# Patient Record
Sex: MALE | Race: WHITE
[De-identification: names, ages, dates, MRNs, and addresses within clinical notes are randomized per-mention and may not be internally consistent; named-entity substitution may affect disease eponyms.]

---

## 2017-05-26 ENCOUNTER — HOSPITAL ENCOUNTER (OUTPATIENT)
Dept: HOSPITAL 75 - CARD | Age: 67
End: 2017-05-26
Attending: NURSE PRACTITIONER
Payer: MEDICARE

## 2017-05-26 DIAGNOSIS — E78.4: ICD-10-CM

## 2017-05-26 DIAGNOSIS — I25.10: ICD-10-CM

## 2017-05-26 DIAGNOSIS — I35.8: Primary | ICD-10-CM

## 2017-05-26 PROCEDURE — 93306 TTE W/DOPPLER COMPLETE: CPT

## 2017-05-30 NOTE — ECHOCARDIOGRAPHY REPORT
DATE OF SERVICE:  05/26/2017



ECHOCARDIOGRAM



ORDERING PHYSICIAN:

JOHN Molina



CLINICAL DIAGNOSES:

Cardiac systolic murmur, coronary artery disease, hyperlipidemia.



MEASUREMENTS:

Aortic root, 3.9.  

Left atrium, 4.  

LV diameter diastolic, 4.2. 

aVF thickness diastolic, 1.1.  

LVPW thickness diastolic, 1.1.



DESCRIPTION:

Two dimensional echocardiography shows normal global left ventricular systolic

function without any distinct regional wall motion abnormality.  Aortic, mitral

and tricuspid valve leaflets show good leaflet excursion.  There is moderate

aortic valve sclerosis and calcification.  Aortic valve leaflet structure is not

very well visualized.  Doppler imaging shows trivial mitral and tricuspid

regurgitation.  Pulmonary artery systolic pressure is estimated to be within

normal limits.  There is no Doppler evidence of any significant valvular

stenosis.  Subcostal views are limited and difficult.  On the views available,

there did not appear to be any distinct evidence of significant intracardiac

shunt.  However, the subcostal views are limited and intracardiac shunt cannot

be excluded on this study.



CONCLUSIONS:

1.  Normal global left ventricular systolic function with ejection fraction 55%

of 60%.

2.  Trivial mitral and tricuspid regurgitation.

3.  Aortic valve sclerosis, moderate, without valvular stenosis.

4.  Pulmonary artery systolic pressure is estimated to be within normal limits.





Job ID: [id number]

DocumentID: [id number]

Dictated Date:  05/30/2017 15:37:15

Transcription Date: 05/30/2017 17:14:44

Dictated By: SKYLER CLOUD MD, MA, FACP, FACC,

## 2017-10-03 NOTE — HISTORY AND PHYSICAL
DATE OF SERVICE:  



This is for outpatient surgery on 10/11/2017 for left knee arthroscopy.



HISTORY:

The patient is a 67-year-old gentleman with complaints of left knee pain.  He

has undergone treatment with injections which provided temporary relief of his

symptoms.  He reports pain on the lateral aspect of his knee.  He reports

popping, catching and swelling.  He also reports pain medially.  He ambulates

with a cane occasionally due to the pain, due to functional impairment and

failure to improve with conservative measures.  The patient has elected to

proceed with surgical intervention.



REVIEW OF SYSTEMS:

No chest pain.  No shortness of breath, no dysuria.



PAST MEDICAL HISTORY:

Coronary artery disease, chronic obstructive pulmonary disease, lumbago,

hyperlipidemia, hypothyroidism.



PAST SURGICAL HISTORY:

Rhinoplasty, coronary stent, CABG.



FAMILY HISTORY:

Cancer, lung disease, ischemic heart disease.



PRIMARY CARE PHYSICIAN:

Dr. Alicia.



MEDICATIONS:

1.  Furosemide.

2.  Metoprolol.

3.  Lipitor.

4.  Nitrostat.

5.  ProAir.

6.  Pravastatin.

7.  Prednisone.

8.  Advair.

9.  Levothyroxine.

10.  Aspirin.

11.  Potassium.



ALLERGIES:

No known drug allergies.



SOCIAL HISTORY:

The patient smokes one pack of cigarettes per day.  Drinks alcohol occasionally.

 



PHYSICAL EXAMINATION:  

GENERAL:  The patient is well-developed, well-nourished in no acute distress.

HEENT:  Normocephalic and atraumatic.  Pupils are equal, round and reactive to

light.  Oropharynx is clear.

NECK:  Supple.  No lymphadenopathy. 

LUNGS:  Clear to auscultation bilaterally.

HEART:  Regular rate and rhythm.  

ABDOMEN:  Soft, nontender and nondistended.

EXTREMITIES:  The left knee demonstrates no erythema or warmth.  No skin

lesions.  Range of motion is 0/0/130.  He has tenderness over the medial and

lateral joint lines and has pain medially with Mikki's.  He has pain with

_____ with crepitus noted.  He is _____ stable in all planes.



IMPRESSION:

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



PLAN:

Left knee arthroscopy with partial medial meniscectomy and chondroplasty.  The

risks, benefits, options, ramifications and recovery have been discussed with

the patient.  He understands and wishes to proceed.





Job ID: [id number]

DocumentID: [id number]

Dictated Date:  10/03/2017 15:12:34

Transcription Date: 10/03/2017 16:21:56

Dictated By: СЕРГЕЙ CERVANTES MD

## 2017-10-05 ENCOUNTER — HOSPITAL ENCOUNTER (OUTPATIENT)
Dept: HOSPITAL 75 - PREOP | Age: 67
Discharge: HOME | End: 2017-10-05
Attending: ORTHOPAEDIC SURGERY
Payer: MEDICARE

## 2017-10-05 VITALS — WEIGHT: 222.19 LBS | HEIGHT: 71 IN | BODY MASS INDEX: 31.1 KG/M2

## 2017-10-05 VITALS — SYSTOLIC BLOOD PRESSURE: 132 MMHG | DIASTOLIC BLOOD PRESSURE: 73 MMHG

## 2017-10-05 DIAGNOSIS — S83.242A: ICD-10-CM
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
## 2017-10-05 DIAGNOSIS — Z01.812: Primary | ICD-10-CM
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
## 2017-10-05 LAB
ANION GAP SERPL CALC-SCNC: 8 MMOL/L (ref 5–14)
BUN SERPL-MCNC: 11 MG/DL (ref 7–18)
BUN/CREAT SERPL: 11
CALCIUM SERPL-MCNC: 8.9 MG/DL (ref 8.5–10.1)
CHLORIDE SERPL-SCNC: 106 MMOL/L (ref 98–107)
CO2 SERPL-SCNC: 24 MMOL/L (ref 21–32)
CREAT SERPL-MCNC: 1.04 MG/DL (ref 0.6–1.3)
GFR SERPLBLD BASED ON 1.73 SQ M-ARVRAT: > 60 ML/MIN
GLUCOSE SERPL-MCNC: 109 MG/DL (ref 70–105)
POTASSIUM SERPL-SCNC: 4.2 MMOL/L (ref 3.6–5)
SODIUM SERPL-SCNC: 138 MMOL/L (ref 135–145)

## 2017-10-05 PROCEDURE — 36415 COLL VENOUS BLD VENIPUNCTURE: CPT

## 2017-10-05 PROCEDURE — 80048 BASIC METABOLIC PNL TOTAL CA: CPT

## 2017-10-05 PROCEDURE — 87081 CULTURE SCREEN ONLY: CPT

## 2017-10-11 ENCOUNTER — HOSPITAL ENCOUNTER (OUTPATIENT)
Dept: HOSPITAL 75 - SDC | Age: 67
Discharge: HOME | End: 2017-10-11
Attending: ORTHOPAEDIC SURGERY
Payer: MEDICARE

## 2017-10-11 VITALS — SYSTOLIC BLOOD PRESSURE: 126 MMHG | DIASTOLIC BLOOD PRESSURE: 98 MMHG

## 2017-10-11 VITALS — DIASTOLIC BLOOD PRESSURE: 77 MMHG | SYSTOLIC BLOOD PRESSURE: 132 MMHG

## 2017-10-11 VITALS — SYSTOLIC BLOOD PRESSURE: 116 MMHG | DIASTOLIC BLOOD PRESSURE: 92 MMHG

## 2017-10-11 VITALS — BODY MASS INDEX: 31.1 KG/M2 | WEIGHT: 222.19 LBS | HEIGHT: 71 IN

## 2017-10-11 VITALS — SYSTOLIC BLOOD PRESSURE: 140 MMHG | DIASTOLIC BLOOD PRESSURE: 89 MMHG

## 2017-10-11 DIAGNOSIS — E78.5: ICD-10-CM

## 2017-10-11 DIAGNOSIS — M23.222: ICD-10-CM

## 2017-10-11 DIAGNOSIS — J44.9: ICD-10-CM

## 2017-10-11 DIAGNOSIS — Z79.82: ICD-10-CM

## 2017-10-11 DIAGNOSIS — I25.10: ICD-10-CM

## 2017-10-11 DIAGNOSIS — M54.5: ICD-10-CM

## 2017-10-11 DIAGNOSIS — Z79.899: ICD-10-CM

## 2017-10-11 DIAGNOSIS — M23.252: Primary | ICD-10-CM

## 2017-10-11 DIAGNOSIS — M94.262: ICD-10-CM

## 2017-10-11 DIAGNOSIS — E03.9: ICD-10-CM

## 2017-10-11 NOTE — PROGRESS NOTE-PRE OPERATIVE
Pre-Operative Progress Note


H&P Reviewed


The H&P was reviewed, patient examined and no changes noted.


Date Seen by Provider:  Oct 11, 2017


Time Seen by Provider:  08:47


Date H&P Reviewed:  Oct 11, 2017


Time H&P Reviewed:  08:47


Pre-Operative Diagnosis:  left knee medial meniscus tear and chondromalacia











СЕРГЕЙ CERVANTES MD Oct 11, 2017 08:47

## 2017-10-11 NOTE — PHYSICAL THERAPY ORTHO EVAL
PT Orthopedic Evaluation


Type of Surgery


Knee Scope


Elective Left knee scope due to progressive pain and degeneration.  Reports his 

knee feels better than it did before surgery today.





Prior Level of Function


Current Living Status:  Spouse


Locomotion     (Upon Admit):  Independent


Established Durable Medical Eq:  Straight Cane





Subjective


Subjective


Pt reports he is feeling good and ready to get home.  He is hoping to golf 

later this week.


Entry Into Home:  Stairs With Railing


Steps Into Home:  2


Steps Accessories:  Railing Present





Motor Control


Motor Control:  Motor Control WNL





ROM


ROM:  WFL, except focal deficit


left knee active 8-110 degrees





Strength


Strength:  WFL





Transfer


Transfers (B, C, W/C) (FIM):  6





Gait


Gait Assistive Device:  Cane Single Point


Left Lower Extremity:  Left


Weight Bearing Status LLE:  Weight Bearing/Tolerated





Instructed patient on sequence for stair ambulation.  Advised to limit


time on his leg today with instruction to ice and elevate.


Distance (FIM):  3=150 ft


Distance:  150


Gait Level of Assist:  6


Summary/Comments


demonstrates safe use of single pain cane





Treatment Rendered


Treatment:  Therapeutic Exercises, Issued Written HEP


Exercise Instruction:  Quad Sets, Straight Leg Raise, Heel Slides





Assessment/Goals


Goal Time Frame:  1 Visit


Understands HEP:  Yes


Safe Ambulation:  Yes





Plan


Treatment Plan:  Discharge


Treatment Duration:  1 visit


Visits Per Week:  1


PT/Family Agrees to Plan:  Yes





Time


Time In:  1050


Time Out:  1110


Total Billed Treatment Time:  15


Billed Treatment Time


visit, evaluation moderate complexity 20 minutes


Yes





PT/OT Therapy GCodes


Therapy


Functional Limitation:  Physical Therapy


Test(s)/Tool used to determine:  FIM





Functional Limitation-Current


Charge Code:  MOBCUR


Modifier:  CH





Functional Limitation-Goal


Charge Code:  MOBGOAL


Modifier:  CH





Functional Limitation-D/C


Charge Codes:  MOBDC


Modifier:  CH











PAT CHU PT Oct 11, 2017 11:18

## 2017-10-11 NOTE — PROGRESS NOTE-POST OPERATIVE
Post-Operative Progess Note


Surgeon (s)/Assistant (s)


Surgeon


СЕРГЕЙ CERVANTES MD


Assistant:  Da Martínez





Pre-Operative Diagnosis


left knee medial meniscus tear and chondromalacia





Post-Operative Diagnosis





left knee medial and lateral  meniscus tears and chondromalacia of the


medial femoral condyle and medial tibial plateau





Procedure & Operative Findings


Date of Procedure


10/11/17


Procedure Performed/Findings


left knee arthroscopic partial medial and lateral meniscectomies and 

chondroplasty of the medial femoral condyle and medial tibial plateau


Anesthesia Type


GETA





Estimated Blood Loss


Estimated blood loss (mL):  minimal





Specimens/Packing


Specimens Removed


none


Packing:  


none











СЕРГЕЙ CERVANTES MD Oct 11, 2017 08:49

## 2017-10-12 NOTE — OPERATIVE REPORT
DATE OF SERVICE:  10/11/2017



PREOPERATIVE DIAGNOSES:

1.  Left knee medial meniscal tear.

2.  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 



POSTOPERATIVE DIAGNOSES:

1.  Left knee medial meniscal tear.

2.  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  

3.  Left knee chondromalacia of the medial tibial plateau. 

4.  Left knee lateral meniscus tear.  



PROCEDURES:  

1.  Left knee arthroscopic partial medial meniscectomy.

2.  Left knee arthroscopic partial lateral meniscectomy.  

3.  Left knee arthroscopic chondroplasty of the medial femoral condyle.  

4.  Left knee arthroscopic chondroplasty of the medial tibial plateau.  



SURGEON:  

Amilcar Cervantes M.D.



ASSISTANT:

TAWNY Lezama, who assisted throughout the procedure and closed the

incisions.



ANESTHESIA:  

General endotracheal by Cindy Bernal CRNA.



TOURNIQUET TIME:  

Not applicable.  



ESTIMATED BLOOD LOSS:  

Minimal.



DRAINS:  

None.  



COMPLICATIONS:  

None.  



POSTOPERATIVE PLANS:  

Routine arthroscopy protocol.  The patient was transferred to the recovery room

awake and in stable condition.  



STATEMENT OF MEDICAL NECESSITY:

The patient is a 67-year-old gentleman with complaints of left medial knee pain,

catching, locking and swelling.  He was tender along his medial joint line and

had pain medially with Mikki's.  He had undergone treatment with injections,

anti-inflammatories and rest without relief.  Due to functional impairment and

failure to improve with conservative measures, the patient elected to proceed

with surgical intervention.  Examination under anesthesia revealed range of

motion 0/3/130 with the negative Lachman, negative anterior and posterior

drawer.  No varus or valgus laxity and negative pivot shift.  



ARTHROSCOPIC FINDINGS:  

The patella _____ demonstrated no gross condylar abnormalities.  The medial and

lateral gutters were clear.  There lateral compartment demonstrated a horizontal

cleavage tear of the posterior horn and body of the meniscus involving

approximately one third of the meniscus.   No significant chondral pathology was

noted.  The ACL and PCL were intact.  The medial compartment demonstrated

diffuse grade 3 chondral flaps with the central portion of the femoral condyle

in a 15 x 20 area and over the central portion of the tibial plateau in a 15 x

15 area.  In addition there was a horizontal cleavage tear of the posterior horn

of the medial meniscus involving approximately one half of the posterior horn.



PROCEDURE:  

After risks and benefits of the procedure were discussed and questions were

answered, an informed consent was signed and placed on the chart.  The operative

site was confirmed in the preoperative holding area and initialed by the

surgeons.  The patient then transported to the operating room and after adequate

levels of general endotracheal anesthetic were obtained and time out was called

and confirmed the operative site.  Exam under anesthesia was performed with the

above findings noted.  The left lower extremity was prepped and draped in the

usual sterile fashion.  The knee joint was injected with 60 mL of fluid and a

standard inferior lateral portal was placed through the arthroscope.  Under

direct visualization inferior medial port was created, the menisci cruciates

carefully probed with no findings noted.  The unstable chondral flaps on the

medial femoral condyle and medial tibial plateau were debrided and shaved back

to a staple edge.  Posterior horn of the medial meniscus was debrided with a

biter and shaver removing approximately 1/2 of the posterior horn.  This was

carefully probed with no further tearing or instability noted.  The scope was

then redirected into the lateral compartment where the unstable lateral meniscus

tear was debrided with a biter and shaver, removing approximately 1/3 of the

posterior horn and body.  This was carefully probed with no further tearing or

instability noted.  The knee was copiously irrigated and port site was closed

with 3-0 nylon in simple interrupted fashion.  The knee was injected with

Duramorph and the port sites were infiltrated plain Marcaine and soft dressing

was applied and the patient transferred to the recovery room awake in stable

condition.





Job ID: [id number]

DocumentID: [id number]

Dictated Date:  10/11/2017 09:30:25

Transcription Date: 10/11/2017 17:22:56

Dictated By: AMILCAR CERVANTES MD

## 2018-11-13 ENCOUNTER — HOSPITAL ENCOUNTER (OUTPATIENT)
Dept: HOSPITAL 75 - CARD | Age: 68
End: 2018-11-13
Attending: NURSE PRACTITIONER
Payer: MEDICARE

## 2018-11-13 VITALS — HEIGHT: 71 IN | WEIGHT: 222 LBS | BODY MASS INDEX: 31.08 KG/M2

## 2018-11-13 DIAGNOSIS — I25.10: Primary | ICD-10-CM

## 2018-11-13 DIAGNOSIS — E78.5: ICD-10-CM

## 2018-11-13 DIAGNOSIS — R06.02: ICD-10-CM

## 2018-11-13 PROCEDURE — 93017 CV STRESS TEST TRACING ONLY: CPT

## 2018-11-13 PROCEDURE — 78452 HT MUSCLE IMAGE SPECT MULT: CPT

## 2018-11-14 NOTE — STRESS TEST
DATE OF SERVICE:  11/13/2018



RESTING AND POST REGADENOSON TECHNETIUM-99M TETROFOSMIN SPECT CT IMAGING



ORDERING PHYSICIAN:

JOHN Molina.





CLINICAL DIAGNOSES:

Shortness of breath, coronary artery disease.



Baseline images were carried out after injection of 11 mCi technetium-99m

Tetrofosmin.  This was followed by 0.4 mg regadenoson and 29.8 mCi

technetium-99m Tetrofosmin for stress imaging.  The electrocardiogram showed

sinus rhythm at baseline.  The electrocardiogram did not change significantly

with the regadenoson infusion.  The patient tolerated the procedure well. 

Review of images at rest and following stress does not indicate any distinct

perfusion defects consistent with myocardial ischemia or infarction.  Some

degree of diaphragmatic attenuation seen both at rest and following regadenoson

infusion.  Gated images show normal global left ventricular systolic function

with normal regional wall motion, including the diaphragmatic wall of the left

ventricle.  Left ventricular ejection fraction is calculated to be 62%.  Left

ventricular end diastolic volume is 76 mL.  TID is absent (1.12).



CONCLUSIONS:

1.  No evidence of any significant myocardial ischemia or infarction on this

study.

2.  Normal regional wall motion.

3.  Normal global left ventricular systolic function with a calculated ejection

fraction of 62%.





Job ID: [id number]

DocumentID: [id number]

Dictated Date:  11/14/2018 08:23:53

Transcription Date: 11/14/2018 08:53:14

Dictated By: SKYLER CLOUD MD, MA, FACP, FACC,

MTDD

## 2020-07-28 ENCOUNTER — HOSPITAL ENCOUNTER (OUTPATIENT)
Dept: HOSPITAL 75 - CARD | Age: 70
End: 2020-07-28
Attending: INTERNAL MEDICINE
Payer: MEDICARE

## 2020-07-28 DIAGNOSIS — I65.29: ICD-10-CM

## 2020-07-28 DIAGNOSIS — E78.5: ICD-10-CM

## 2020-07-28 DIAGNOSIS — J44.9: ICD-10-CM

## 2020-07-28 DIAGNOSIS — I25.10: ICD-10-CM

## 2020-07-28 DIAGNOSIS — I08.0: Primary | ICD-10-CM

## 2020-07-28 LAB
ALBUMIN SERPL-MCNC: 4.3 GM/DL (ref 3.2–4.5)
ALP SERPL-CCNC: 57 U/L (ref 40–136)
ALT SERPL-CCNC: 31 U/L (ref 0–55)
BASOPHILS # BLD AUTO: 0.1 10^3/UL (ref 0–0.1)
BASOPHILS NFR BLD AUTO: 2 % (ref 0–10)
BILIRUB SERPL-MCNC: 0.5 MG/DL (ref 0.1–1)
BUN/CREAT SERPL: 9
CALCIUM SERPL-MCNC: 9.2 MG/DL (ref 8.5–10.1)
CHLORIDE SERPL-SCNC: 107 MMOL/L (ref 98–107)
CHOLEST SERPL-MCNC: 250 MG/DL (ref ?–200)
CO2 SERPL-SCNC: 20 MMOL/L (ref 21–32)
CREAT SERPL-MCNC: 1.53 MG/DL (ref 0.6–1.3)
EOSINOPHIL # BLD AUTO: 0.3 10^3/UL (ref 0–0.3)
EOSINOPHIL NFR BLD AUTO: 5 % (ref 0–10)
ERYTHROCYTE [DISTWIDTH] IN BLOOD BY AUTOMATED COUNT: 15.6 % (ref 10–14.5)
ERYTHROCYTE [SEDIMENTATION RATE] IN BLOOD: 9 MM/HR (ref 0–30)
GFR SERPLBLD BASED ON 1.73 SQ M-ARVRAT: 45 ML/MIN
GLUCOSE SERPL-MCNC: 108 MG/DL (ref 70–105)
HCT VFR BLD CALC: 42 % (ref 40–54)
HDLC SERPL-MCNC: 33 MG/DL (ref 40–60)
HGB BLD-MCNC: 14.4 G/DL (ref 13.3–17.7)
LYMPHOCYTES # BLD AUTO: 2.1 X 10^3 (ref 1–4)
LYMPHOCYTES NFR BLD AUTO: 31 % (ref 12–44)
MAGNESIUM SERPL-MCNC: 2.3 MG/DL (ref 1.6–2.4)
MANUAL DIFFERENTIAL PERFORMED BLD QL: NO
MCH RBC QN AUTO: 36 PG (ref 25–34)
MCHC RBC AUTO-ENTMCNC: 34 G/DL (ref 32–36)
MCV RBC AUTO: 105 FL (ref 80–99)
MONOCYTES # BLD AUTO: 0.7 X 10^3 (ref 0–1)
MONOCYTES NFR BLD AUTO: 11 % (ref 0–12)
NEUTROPHILS # BLD AUTO: 3.4 X 10^3 (ref 1.8–7.8)
NEUTROPHILS NFR BLD AUTO: 51 % (ref 42–75)
PLATELET # BLD: 166 10^3/UL (ref 130–400)
PMV BLD AUTO: 10.6 FL (ref 7.4–10.4)
POTASSIUM SERPL-SCNC: 4.3 MMOL/L (ref 3.6–5)
PROT SERPL-MCNC: 7.3 GM/DL (ref 6.4–8.2)
SODIUM SERPL-SCNC: 137 MMOL/L (ref 135–145)
TRIGL SERPL-MCNC: 366 MG/DL (ref ?–150)
VLDLC SERPL CALC-MCNC: 73 MG/DL (ref 5–40)
WBC # BLD AUTO: 6.7 10^3/UL (ref 4.3–11)

## 2020-07-28 PROCEDURE — 36415 COLL VENOUS BLD VENIPUNCTURE: CPT

## 2020-07-28 PROCEDURE — 83735 ASSAY OF MAGNESIUM: CPT

## 2020-07-28 PROCEDURE — 85652 RBC SED RATE AUTOMATED: CPT

## 2020-07-28 PROCEDURE — 84443 ASSAY THYROID STIM HORMONE: CPT

## 2020-07-28 PROCEDURE — 80053 COMPREHEN METABOLIC PANEL: CPT

## 2020-07-28 PROCEDURE — 93306 TTE W/DOPPLER COMPLETE: CPT

## 2020-07-28 PROCEDURE — 80061 LIPID PANEL: CPT

## 2020-07-28 PROCEDURE — 85025 COMPLETE CBC W/AUTO DIFF WBC: CPT

## 2021-10-01 ENCOUNTER — HOSPITAL ENCOUNTER (OUTPATIENT)
Dept: HOSPITAL 75 - RAD | Age: 71
End: 2021-10-01
Attending: INTERNAL MEDICINE
Payer: MEDICARE

## 2021-10-01 DIAGNOSIS — K76.0: Primary | ICD-10-CM

## 2021-10-01 LAB
ALBUMIN SERPL-MCNC: 4.3 GM/DL (ref 3.2–4.5)
ALP SERPL-CCNC: 53 U/L (ref 40–136)
ALT SERPL-CCNC: 21 U/L (ref 0–55)
BILIRUB SERPL-MCNC: 0.9 MG/DL (ref 0.1–1)
BUN/CREAT SERPL: 9
CALCIUM SERPL-MCNC: 9.7 MG/DL (ref 8.5–10.1)
CHLORIDE SERPL-SCNC: 103 MMOL/L (ref 98–107)
CO2 SERPL-SCNC: 20 MMOL/L (ref 21–32)
CREAT SERPL-MCNC: 1.38 MG/DL (ref 0.6–1.3)
GFR SERPLBLD BASED ON 1.73 SQ M-ARVRAT: 51 ML/MIN
GLUCOSE SERPL-MCNC: 100 MG/DL (ref 70–105)
POTASSIUM SERPL-SCNC: 4.4 MMOL/L (ref 3.6–5)
PROT SERPL-MCNC: 7.7 GM/DL (ref 6.4–8.2)
SODIUM SERPL-SCNC: 135 MMOL/L (ref 135–145)

## 2021-10-01 PROCEDURE — 74177 CT ABD & PELVIS W/CONTRAST: CPT

## 2021-10-01 PROCEDURE — 36415 COLL VENOUS BLD VENIPUNCTURE: CPT

## 2021-10-01 PROCEDURE — 80053 COMPREHEN METABOLIC PANEL: CPT

## 2021-10-01 NOTE — DIAGNOSTIC IMAGING REPORT
PROCEDURE: CT abdomen and pelvis with contrast.



TECHNIQUE: Multiple contiguous axial images were obtained through

the abdomen and pelvis after administration of intravenous

contrast. Auto Exposure Controls were utilized during the CT exam

to meet ALARA standards for radiation dose reduction. All CT

scans use one or more of the following dose optimizing

techniques: automated exposure control, MA and/or KvP adjustment

based on patient size and exam type or iterative reconstruction.



INDICATION:  Left-sided abdominal pain and lump.



No prior studies are available for comparison.



Imaging through lung bases does show some linear scarring in the

right lower lobe and lingula. The liver demonstrates diffuse low

density consistent with hepatic steatosis. No liver masses

detected. Gallbladder is unremarkable. No biliary duct dilatation

is seen. The pancreas and spleen are unremarkable. No adrenal

mass is detected. Kidneys are unremarkable. Aorta is calcified

but nonaneurysmal. No central retroperitoneal or mesenteric

lymphadenopathy is detected. The bowel loops appear to be normal

caliber. No obstruction. No free fluid or fluid collection is

seen. The bladder and prostate are unremarkable. No pelvic

lymphadenopathy is seen. Bony structures are nonacute. There is

grade 1 spondylolisthesis of L5 on S1 with bilateral pars

defects.



IMPRESSION:

1. Hepatic steatosis.

2. No acute feature in the abdomen or pelvis is identified.



Dictated by: 



  Dictated on workstation # [id number]

## 2021-12-14 ENCOUNTER — HOSPITAL ENCOUNTER (OUTPATIENT)
Dept: HOSPITAL 75 - PREOP | Age: 71
Discharge: HOME | End: 2021-12-14
Attending: SURGERY
Payer: MEDICARE

## 2021-12-14 VITALS — HEIGHT: 70.98 IN | WEIGHT: 218.04 LBS | BODY MASS INDEX: 30.52 KG/M2

## 2021-12-14 DIAGNOSIS — Z20.822: ICD-10-CM

## 2021-12-14 DIAGNOSIS — R63.4: ICD-10-CM

## 2021-12-14 DIAGNOSIS — Z01.812: Primary | ICD-10-CM

## 2021-12-14 PROCEDURE — 87635 SARS-COV-2 COVID-19 AMP PRB: CPT

## 2022-01-14 ENCOUNTER — HOSPITAL ENCOUNTER (OUTPATIENT)
Dept: HOSPITAL 75 - PREOP | Age: 72
Discharge: HOME | End: 2022-01-14
Attending: SURGERY
Payer: MEDICARE

## 2022-01-14 VITALS — WEIGHT: 218.04 LBS | HEIGHT: 70.98 IN | BODY MASS INDEX: 30.52 KG/M2

## 2022-01-14 DIAGNOSIS — R63.4: ICD-10-CM

## 2022-01-14 DIAGNOSIS — U07.1: ICD-10-CM

## 2022-01-14 DIAGNOSIS — Z01.812: Primary | ICD-10-CM

## 2022-01-14 PROCEDURE — 87635 SARS-COV-2 COVID-19 AMP PRB: CPT

## 2022-02-28 ENCOUNTER — HOSPITAL ENCOUNTER (OUTPATIENT)
Dept: HOSPITAL 75 - PREOP | Age: 72
Discharge: HOME | End: 2022-02-28
Attending: SURGERY
Payer: MEDICARE

## 2022-02-28 VITALS — WEIGHT: 218.26 LBS | HEIGHT: 70.87 IN | BODY MASS INDEX: 30.56 KG/M2

## 2022-02-28 DIAGNOSIS — Z01.818: Primary | ICD-10-CM

## 2022-03-08 ENCOUNTER — HOSPITAL ENCOUNTER (OUTPATIENT)
Dept: HOSPITAL 75 - ENDO | Age: 72
Discharge: HOME | End: 2022-03-08
Attending: SURGERY
Payer: MEDICARE

## 2022-03-08 VITALS — HEIGHT: 70.87 IN | BODY MASS INDEX: 30.56 KG/M2 | WEIGHT: 218.26 LBS

## 2022-03-08 VITALS — DIASTOLIC BLOOD PRESSURE: 65 MMHG | SYSTOLIC BLOOD PRESSURE: 99 MMHG

## 2022-03-08 VITALS — SYSTOLIC BLOOD PRESSURE: 111 MMHG | DIASTOLIC BLOOD PRESSURE: 63 MMHG

## 2022-03-08 VITALS — DIASTOLIC BLOOD PRESSURE: 88 MMHG | SYSTOLIC BLOOD PRESSURE: 124 MMHG

## 2022-03-08 VITALS — DIASTOLIC BLOOD PRESSURE: 65 MMHG | SYSTOLIC BLOOD PRESSURE: 102 MMHG

## 2022-03-08 VITALS — DIASTOLIC BLOOD PRESSURE: 77 MMHG | SYSTOLIC BLOOD PRESSURE: 104 MMHG

## 2022-03-08 VITALS — SYSTOLIC BLOOD PRESSURE: 104 MMHG | DIASTOLIC BLOOD PRESSURE: 77 MMHG

## 2022-03-08 DIAGNOSIS — D12.8: ICD-10-CM

## 2022-03-08 DIAGNOSIS — K21.00: ICD-10-CM

## 2022-03-08 DIAGNOSIS — D12.5: ICD-10-CM

## 2022-03-08 DIAGNOSIS — Z72.0: ICD-10-CM

## 2022-03-08 DIAGNOSIS — D12.3: ICD-10-CM

## 2022-03-08 DIAGNOSIS — D12.0: Primary | ICD-10-CM

## 2022-03-08 DIAGNOSIS — D12.4: ICD-10-CM

## 2022-03-08 DIAGNOSIS — I95.9: ICD-10-CM

## 2022-03-08 DIAGNOSIS — K57.30: ICD-10-CM

## 2022-03-08 DIAGNOSIS — D12.2: ICD-10-CM

## 2022-03-08 PROCEDURE — 88305 TISSUE EXAM BY PATHOLOGIST: CPT

## 2022-03-08 NOTE — OPERATIVE REPORT
DATE OF SERVICE:  03/08/2022



PREOPERATIVE DIAGNOSIS:

Weight loss.



POSTOPERATIVE DIAGNOSES:

Mucosal change duodenum, reflux esophagitis, colon polyps x28, diverticulosis.



PROCEDURES:

EGD with biopsies, colonoscopy with hot biopsy polypectomy x21, snare

polypectomy x7.  Darleen inking of the polyp and sigmoid.



SURGEON:

Charito Rodriguez DO



ANESTHESIA:

Per MD.



ESTIMATED BLOOD LOSS:

Scant.



COMPLICATIONS:

None.



INDICATIONS:

The patient is a 71-year-old male who has had some weight loss and also some

left-sided abdominal pain.  He understands risks and benefits of procedure and

wishes to proceed.  Consent was signed in the chart.



DESCRIPTION OF PROCEDURE:

The patient was taken to the endoscopy suite, placed in left lateral recumbent

position.  Timeout was performed.  Scope was inserted in mouth, down the

esophagus, stomach and into the duodenum without difficulty.  No polyps, masses

or ulcerations within the duodenum.  There was a slight mucosal change in the

second portion, which cold biopsy was obtained.  Scope was slowly retracted back

into the stomach where it was further insufflated.  No polyps, masses or

ulcerations.  Biopsy of the antrum was obtained.  Scope was retroflexed noting

no other pathology, may be really a small hiatal hernia.  Scope was returned to

its normal position, slowly withdrawn to the distal esophagus, some changes of

reflux esophagitis present.  Biopsy of the GE junction was obtained.  Scope was

slowly retracted back to completely remove, noting no other pathology.  Digital

rectal exam was performed, palpable polyp in the rectum, no masses or

ulcerations.  Scope was inserted in the rectum and advanced all the way to cecum

with minimal difficulty.  Prep was adequate with some irrigation and suction. 

Scope was then slowly retracted back.  There were three polyps in the cecum,

which hot biopsy polypectomy was performed.  Scope was then continuously

retracted back in the ascending colon, where 4 polyps were present, which hot

biopsy polypectomy was performed.  Scope was then continued to be retracted back

and in transverse colon, three polyps were present, which hot biopsy polypectomy

was performed.  In the descending colon, 6 polyps were present, which hot biopsy

polypectomy was performed.  In the sigmoid, there were 4 polyps, which snare

polypectomy was performed.  A larger polyp was present, which had to be taken

out in piecemeal.  This did have the appearance of some fat coming out after it

was snared that was present, may be suggestive of a lipoma in this area as well.

 Just distal to this area, 2 mL of Darleen ink was tattooed, so this area could be

reexamined later.  Scope was then continuously slowly retracted back into the

rectum where there are 5 polyps, which hot biopsy polypectomy was performed. 

There were 2 polyps, right at the distal rectum, which snare polypectomies were

performed.  Scope was retroflexed noting no other pathology.  Scope was returned

to its normal position, slowly withdrawn until completely removed.  The patient

tolerated the procedure well without any complications, taken to recovery room

in stable condition.



RECOMMENDATIONS:

The patient will have repeat colonoscopy in 3 months to reevaluate these areas. 

Await biopsy results as well.  Further recommendations pending biopsy results

and symptoms.





Job ID: [id number]

DocumentID: [id number]

Dictated Date:  03/08/2022 11:17:31

Transcription Date: 03/08/2022 16:57:24

Dictated By: CHARITO RODRIGUEZ DO

## 2022-03-08 NOTE — ANESTHESIA-GENERAL POST-OP
MAC


Patient Condition


Mental Status/LOC:  Same as Preop


Cardiovascular:  Satisfactory


Nausea/Vomiting:  Absent


Respiratory:  Satisfactory


Pain:  Controlled


Complications:  Absent





Post Op Complications


Complications


None





Follow Up Care/Instructions


Patient Instructions


None needed.





Anesthesiology Discharge Order


Discharge Order


Patient was doing well after the procedure, no complaints, stable vital signs, 

no apparent adverse anesthesia problems.











SOPHIA SCHNEIDER DO          Mar 8, 2022 14:39

## 2022-03-08 NOTE — PROGRESS NOTE-POST OPERATIVE
Post-Operative Progess Note


Surgeon (s)/Assistant (s)


Surgeon


CHARITO MENDIOLA DO


Assistant:  na





Pre-Operative Diagnosis


wt loss





Post-Operative Diagnosis





mucosal change duodenum,


reflux esophagitis,


colon polyps


diverticulosis





Procedure & Operative Findings


Date of Procedure


3/8/22


Procedure Performed/Findings


egd c biopsies,


colonoscopy with hot bx polypectomy x 21


snare polypectomy x 7


matilde inking of polyp in sigmoid.


Anesthesia Type


per mda





Estimated Blood Loss


Estimated blood loss (mL):  scant





Specimens/Packing


Specimens Removed


duodenum, antrum, ge, colon polyps











CHARITO MENDIOLA DO               Mar 8, 2022 11:11

## 2022-03-08 NOTE — DISCHARGE INST-SIMPLE/STANDARD
Discharge Inst-Standard


Patient Instructions/Follow Up


Plan of Care/Instructions/FU:  


2 weeks Michael


Activity as Tolerated:  Yes


Discharge Diet:  Regular Diet (high fiber)











CHARITO MENDIOLA DO               Mar 8, 2022 11:13

## 2022-08-15 ENCOUNTER — HOSPITAL ENCOUNTER (OUTPATIENT)
Dept: HOSPITAL 75 - CARD | Age: 72
End: 2022-08-15
Attending: NURSE PRACTITIONER
Payer: MEDICARE

## 2022-08-15 DIAGNOSIS — I35.0: Primary | ICD-10-CM

## 2022-08-15 PROCEDURE — 93306 TTE W/DOPPLER COMPLETE: CPT

## 2022-08-30 ENCOUNTER — HOSPITAL ENCOUNTER (OUTPATIENT)
Dept: HOSPITAL 75 - CARD | Age: 72
End: 2022-08-30
Attending: NURSE PRACTITIONER
Payer: MEDICARE

## 2022-08-30 VITALS — WEIGHT: 200.62 LBS | BODY MASS INDEX: 28.09 KG/M2 | HEIGHT: 70.87 IN

## 2022-08-30 VITALS — SYSTOLIC BLOOD PRESSURE: 129 MMHG | DIASTOLIC BLOOD PRESSURE: 93 MMHG

## 2022-08-30 DIAGNOSIS — I25.10: Primary | ICD-10-CM

## 2022-08-30 PROCEDURE — 93017 CV STRESS TEST TRACING ONLY: CPT

## 2022-08-30 PROCEDURE — 78452 HT MUSCLE IMAGE SPECT MULT: CPT

## 2022-08-30 RX ADMIN — Medication PRN ML: at 07:57

## 2022-08-30 RX ADMIN — Medication PRN ML: at 09:22

## 2022-09-14 ENCOUNTER — HOSPITAL ENCOUNTER (OUTPATIENT)
Dept: HOSPITAL 75 - RAD | Age: 72
End: 2022-09-14
Attending: SURGERY
Payer: MEDICARE

## 2022-09-14 DIAGNOSIS — K55.1: Primary | ICD-10-CM

## 2022-09-14 LAB
ALBUMIN SERPL-MCNC: 4.4 GM/DL (ref 3.2–4.5)
ALP SERPL-CCNC: 60 U/L (ref 40–136)
ALT SERPL-CCNC: 14 U/L (ref 0–55)
BILIRUB SERPL-MCNC: 0.6 MG/DL (ref 0.1–1)
BUN/CREAT SERPL: 12
CALCIUM SERPL-MCNC: 9.3 MG/DL (ref 8.5–10.1)
CHLORIDE SERPL-SCNC: 104 MMOL/L (ref 98–107)
CO2 SERPL-SCNC: 23 MMOL/L (ref 21–32)
CREAT SERPL-MCNC: 1.19 MG/DL (ref 0.6–1.3)
GFR SERPLBLD BASED ON 1.73 SQ M-ARVRAT: 64 ML/MIN
GLUCOSE SERPL-MCNC: 92 MG/DL (ref 70–105)
HCT VFR BLD CALC: 49 % (ref 40–54)
HGB BLD-MCNC: 16.5 G/DL (ref 13.3–17.7)
MCH RBC QN AUTO: 36 PG (ref 25–34)
MCHC RBC AUTO-ENTMCNC: 34 G/DL (ref 32–36)
MCV RBC AUTO: 105 FL (ref 80–99)
PLATELET # BLD: 172 10^3/UL (ref 130–400)
PMV BLD AUTO: 10.2 FL (ref 9–12.2)
POTASSIUM SERPL-SCNC: 4.1 MMOL/L (ref 3.6–5)
PROT SERPL-MCNC: 7.8 GM/DL (ref 6.4–8.2)
SODIUM SERPL-SCNC: 138 MMOL/L (ref 135–145)
WBC # BLD AUTO: 7.2 10^3/UL (ref 4.3–11)

## 2022-09-14 PROCEDURE — 74177 CT ABD & PELVIS W/CONTRAST: CPT

## 2022-09-14 PROCEDURE — 85027 COMPLETE CBC AUTOMATED: CPT

## 2022-09-14 PROCEDURE — 80053 COMPREHEN METABOLIC PANEL: CPT

## 2022-09-14 PROCEDURE — 36415 COLL VENOUS BLD VENIPUNCTURE: CPT

## 2022-10-12 ENCOUNTER — HOSPITAL ENCOUNTER (OUTPATIENT)
Dept: HOSPITAL 75 - PREOP | Age: 72
Discharge: HOME | End: 2022-10-12
Attending: SURGERY
Payer: MEDICARE

## 2022-10-12 VITALS — HEIGHT: 70.98 IN | BODY MASS INDEX: 28.43 KG/M2 | WEIGHT: 203.05 LBS

## 2022-10-12 DIAGNOSIS — Z01.818: Primary | ICD-10-CM

## 2022-10-25 ENCOUNTER — HOSPITAL ENCOUNTER (OUTPATIENT)
Dept: HOSPITAL 75 - ENDO | Age: 72
Discharge: HOME | End: 2022-10-25
Attending: SURGERY
Payer: MEDICARE

## 2022-10-25 VITALS — DIASTOLIC BLOOD PRESSURE: 85 MMHG | SYSTOLIC BLOOD PRESSURE: 138 MMHG

## 2022-10-25 VITALS — WEIGHT: 203.05 LBS | HEIGHT: 70.98 IN | BODY MASS INDEX: 28.43 KG/M2

## 2022-10-25 VITALS — SYSTOLIC BLOOD PRESSURE: 102 MMHG | DIASTOLIC BLOOD PRESSURE: 70 MMHG

## 2022-10-25 VITALS — DIASTOLIC BLOOD PRESSURE: 63 MMHG | SYSTOLIC BLOOD PRESSURE: 106 MMHG

## 2022-10-25 DIAGNOSIS — D12.2: ICD-10-CM

## 2022-10-25 DIAGNOSIS — Z79.82: ICD-10-CM

## 2022-10-25 DIAGNOSIS — F17.210: ICD-10-CM

## 2022-10-25 DIAGNOSIS — D12.8: ICD-10-CM

## 2022-10-25 DIAGNOSIS — D12.4: ICD-10-CM

## 2022-10-25 DIAGNOSIS — D12.5: ICD-10-CM

## 2022-10-25 DIAGNOSIS — E66.9: ICD-10-CM

## 2022-10-25 DIAGNOSIS — Z12.11: Primary | ICD-10-CM

## 2022-10-25 PROCEDURE — 88305 TISSUE EXAM BY PATHOLOGIST: CPT

## 2022-10-25 NOTE — ANESTHESIA-GENERAL POST-OP
MAC


Patient Condition


Mental Status/LOC:  Same as Preop


Cardiovascular:  Satisfactory


Nausea/Vomiting:  Absent


Respiratory:  Satisfactory


Pain:  Controlled


Complications:  Absent





Post Op Complications


Complications


None





Follow Up Care/Instructions


Patient Instructions


None needed.





Anesthesiology Discharge Order


Discharge Order


Patient is doing well, no complaints, stable vital signs, no apparent adverse 

anesthesia problems.   


No complications reported per nursing.











PHILIP MONTILLA CRNA         Oct 25, 2022 14:54

## 2022-10-25 NOTE — PROGRESS NOTE-POST OPERATIVE
Post-Operative Progess Note


Surgeon (s)/Assistant (s)


Surgeon


CHARITO MENDIOLA DO


Assistant:  None





Pre-Operative Diagnosis


hx of polyps





Post-Operative Diagnosis





colon polyps, rectal polyp





Procedure & Operative Findings


Date of Procedure


10/25/22


Procedure Performed/Findings


colonoscopy with hot biopsy polypectomy x8


colonoscopy with hot snare biopsy polypectomy x1


Anesthesia Type


per cRNA





Estimated Blood Loss


Estimated blood loss (mL):  none





Specimens/Packing


Specimens Removed


Ascending colon polyp x4


Descending colon polyp x2


Sigmoid colon polyp x2


Rectal colon polyp x1











CHARITO MENDIOLA DO              Oct 25, 2022 12:37

## 2022-10-25 NOTE — DISCHARGE INST-SIMPLE/STANDARD
Discharge Inst-Standard


Reconcile Patient Problems


Problems Reviewed?:  Yes





Patient Instructions/Follow Up


Plan of Care/Instructions/FU:  


Follow up with Dr. Rodriguez in 2 weeks


Activity as Tolerated:  Yes


Discharge Diet:  Regular Diet











CHARITO RODRIGUEZ DO              Oct 25, 2022 12:38

## 2022-10-25 NOTE — OPERATIVE REPORT
DATE OF SERVICE:  10/25/2022



PREOPERATIVE DIAGNOSIS:

History of polyps.



POSTOPERATIVE DIAGNOSIS:

Colon polyps.



PROCEDURE:

Colonoscopy with hot biopsy polypectomy x8 and snare polypectomy x1.



SURGEON:

Charito Rodriguez DO



ANESTHESIA:

Per CRNA.



ESTIMATED BLOOD LOSS:

None.



COMPLICATIONS:

None.



INDICATIONS:

The patient is a 72-year-old male with history of polyps.  He understands risks

and benefits of procedure and wishes to proceed.  Consent was signed in the

chart.



DESCRIPTION OF PROCEDURE:

The patient was taken to the endoscopy suite, placed in left lateral recumbent

position.  Timeout was performed.  Scope was inserted in the rectum, advanced

all the way to cecum with minimal difficulty.  Prep was adequate with irrigation

and suction.  Scope was slowly retracted back.  No polyps, masses or ulcerations

in the cecum.  Ascending colon, 2 polyps were present.  In the proximal portion,

which hot biopsy polypectomy was performed.  Scope was then continuously

retracted back and two more polyps present in the ascending colon, which hot

biopsy polypectomy was performed.  Scope was then continuously retracted back. 

No polyps, masses or ulcerations in the transverse colon and descending colon, 2

polyps were present, which hot biopsy polypectomy was performed.  Scope was then

continuously retracted back in the sigmoid colon where two more polyps were

present, which hot biopsy polypectomy was performed.  Scope was then

continuously retracted back in the rectum where a large flat polyp was present,

which snare polypectomy was performed.  Scope was retroflexed noting no other

pathology.  Scope was returned to its normal position, slowly withdrawn until

completely removed.  The patient tolerated the procedure well without any

complications, taken to recovery room in stable condition.



RECOMMENDATIONS:

We will recommend repeat colonoscopy in one year depending upon pathology,

possibly 3 years.  Recommend a repeat colonoscopy in 1 to 3 years pending upon

pathology.  Any issues before that would reevaluate at that time.  He will

follow up in office in two weeks to discuss pathology results.





Job ID: [id number]

DocumentID: [id number]

Dictated Date:  10/25/2022 13:12:12

Transcription Date: 10/25/2022 21:04:28

Dictated By: CHARITO RODRIGUEZ DO

## 2022-10-30 ENCOUNTER — HOSPITAL ENCOUNTER (INPATIENT)
Dept: HOSPITAL 75 - ER | Age: 72
LOS: 3 days | Discharge: HOME | DRG: 377 | End: 2022-11-02
Attending: INTERNAL MEDICINE | Admitting: INTERNAL MEDICINE
Payer: MEDICARE

## 2022-10-30 VITALS — SYSTOLIC BLOOD PRESSURE: 144 MMHG | DIASTOLIC BLOOD PRESSURE: 84 MMHG

## 2022-10-30 VITALS — BODY MASS INDEX: 29.44 KG/M2 | HEIGHT: 70.87 IN | WEIGHT: 210.32 LBS

## 2022-10-30 DIAGNOSIS — I10: ICD-10-CM

## 2022-10-30 DIAGNOSIS — E03.9: ICD-10-CM

## 2022-10-30 DIAGNOSIS — R57.8: ICD-10-CM

## 2022-10-30 DIAGNOSIS — G89.29: ICD-10-CM

## 2022-10-30 DIAGNOSIS — F17.210: ICD-10-CM

## 2022-10-30 DIAGNOSIS — M54.9: ICD-10-CM

## 2022-10-30 DIAGNOSIS — M19.90: ICD-10-CM

## 2022-10-30 DIAGNOSIS — K92.1: Primary | ICD-10-CM

## 2022-10-30 DIAGNOSIS — J44.9: ICD-10-CM

## 2022-10-30 DIAGNOSIS — Z95.5: ICD-10-CM

## 2022-10-30 DIAGNOSIS — D62: ICD-10-CM

## 2022-10-30 DIAGNOSIS — Z95.1: ICD-10-CM

## 2022-10-30 DIAGNOSIS — I25.10: ICD-10-CM

## 2022-10-30 LAB
ALBUMIN SERPL-MCNC: 3 GM/DL (ref 3.2–4.5)
ALP SERPL-CCNC: 35 U/L (ref 40–136)
ALT SERPL-CCNC: 9 U/L (ref 0–55)
BASOPHILS # BLD AUTO: 0.1 10^3/UL (ref 0–0.1)
BASOPHILS NFR BLD AUTO: 1 % (ref 0–10)
BILIRUB SERPL-MCNC: 0.3 MG/DL (ref 0.1–1)
BUN/CREAT SERPL: 10
CALCIUM SERPL-MCNC: 7.1 MG/DL (ref 8.5–10.1)
CHLORIDE SERPL-SCNC: 113 MMOL/L (ref 98–107)
CO2 SERPL-SCNC: 15 MMOL/L (ref 21–32)
CREAT SERPL-MCNC: 1.26 MG/DL (ref 0.6–1.3)
EOSINOPHIL # BLD AUTO: 0.2 10^3/UL (ref 0–0.3)
EOSINOPHIL NFR BLD AUTO: 3 % (ref 0–10)
GFR SERPLBLD BASED ON 1.73 SQ M-ARVRAT: 60 ML/MIN
GLUCOSE SERPL-MCNC: 95 MG/DL (ref 70–105)
HCT VFR BLD CALC: 31 % (ref 40–54)
HGB BLD-MCNC: 11.1 G/DL (ref 13.3–17.7)
LYMPHOCYTES # BLD AUTO: 3.2 10^3/UL (ref 1–4)
LYMPHOCYTES NFR BLD AUTO: 36 % (ref 12–44)
MANUAL DIFFERENTIAL PERFORMED BLD QL: NO
MCH RBC QN AUTO: 35 PG (ref 25–34)
MCHC RBC AUTO-ENTMCNC: 35 G/DL (ref 32–36)
MCV RBC AUTO: 100 FL (ref 80–99)
MONOCYTES # BLD AUTO: 0.9 10^3/UL (ref 0–1)
MONOCYTES NFR BLD AUTO: 10 % (ref 0–12)
NEUTROPHILS # BLD AUTO: 4.4 10^3/UL (ref 1.8–7.8)
NEUTROPHILS NFR BLD AUTO: 50 % (ref 42–75)
PLATELET # BLD: 151 10^3/UL (ref 130–400)
PMV BLD AUTO: 10.7 FL (ref 9–12.2)
POTASSIUM SERPL-SCNC: 3.1 MMOL/L (ref 3.6–5)
PROT SERPL-MCNC: 5 GM/DL (ref 6.4–8.2)
SODIUM SERPL-SCNC: 135 MMOL/L (ref 135–145)
WBC # BLD AUTO: 9 10^3/UL (ref 4.3–11)

## 2022-10-30 PROCEDURE — 87081 CULTURE SCREEN ONLY: CPT

## 2022-10-30 PROCEDURE — 86900 BLOOD TYPING SEROLOGIC ABO: CPT

## 2022-10-30 PROCEDURE — 86901 BLOOD TYPING SEROLOGIC RH(D): CPT

## 2022-10-30 PROCEDURE — 86920 COMPATIBILITY TEST SPIN: CPT

## 2022-10-30 PROCEDURE — 74019 RADEX ABDOMEN 2 VIEWS: CPT

## 2022-10-30 PROCEDURE — 84100 ASSAY OF PHOSPHORUS: CPT

## 2022-10-30 PROCEDURE — 86850 RBC ANTIBODY SCREEN: CPT

## 2022-10-30 PROCEDURE — 83735 ASSAY OF MAGNESIUM: CPT

## 2022-10-30 PROCEDURE — 36415 COLL VENOUS BLD VENIPUNCTURE: CPT

## 2022-10-30 PROCEDURE — 80053 COMPREHEN METABOLIC PANEL: CPT

## 2022-10-30 PROCEDURE — 85027 COMPLETE CBC AUTOMATED: CPT

## 2022-10-30 PROCEDURE — 85025 COMPLETE CBC W/AUTO DIFF WBC: CPT

## 2022-10-30 PROCEDURE — 94760 N-INVAS EAR/PLS OXIMETRY 1: CPT

## 2022-10-30 RX ADMIN — SODIUM CHLORIDE AND POTASSIUM CHLORIDE SCH MLS/HR: 9; 1.49 INJECTION, SOLUTION INTRAVENOUS at 22:03

## 2022-10-30 NOTE — DIAGNOSTIC IMAGING REPORT
INDICATION: Bloody stools, recent surgery.



COMPARISON: None.



FINDINGS: Supine and upright views the abdomen demonstrate

nonobstructive small bowel gas pattern. Mild amount of air and

stool are seen scattered throughout the colon. No abnormal

air-fluid levels or large collection of free intraperitoneal air

is seen.



No abnormal extraosseous calcifications or radiopaque foreign

bodies are identified. Bony structures are age-appropriate.



IMPRESSION:

1. Nonobstructive small bowel gas pattern.



Dictated by: 



  Dictated on workstation # [id number]

## 2022-10-30 NOTE — TELE-ICU CONSULT
History of Present Illness


History of Present Illness


Date Seen by Provider:  Oct 30, 2022


Time Seen by Provider:  20:00


Date of Admission





History of Present Illness


Remotely managed patient through TELEICU 





72 year old male with recent colonscopy with polypectomy presented to ED today 

for


sudden onset of rectal bleeding. His initial bloo pressure was in 60s systolic 

with some


repsone to fluids. He was given 2 u PRC in ED with improvement in BP. There is 

concern


that he is still having some rectal bleeding. Gen surg is consulted





Vitals reviewed


Labs reviewed


Examination through one-way in-room camera - comfortable in appearance.





Assessment/Plan:


Acute GI bleed - following polypectomy - resolving. GI consulted - defer 

management.


Gen surg consulted as no GI on service


Acute blood loss anemia - s/p 2 u PRBC in ED. continue to monitor with serial 

cbc


Abdominal pain - ordered morphine 





Discussed with RN at bedside





Allergies and Home Medications


Allergies


Coded Allergies:  


     No Known Drug Allergies (Unverified , 10/5/17)





Home Medications


Albuterol Sulfate 1 Puff Puff, 2 PUFF IH Q4H PRN for SHORTNESS OF BREATH, 

(Reported)


   1 PUFF = 90 MCG 


Levothyroxine Sodium 137 Mcg Tablet, 137 MCG PO DAILY, (Reported)


Metoprolol Tartrate 25 Mg Tablet, 25 MG PO BID, (Reported)





Past Medical/Social/Family Hx


Patient Social History


Tobacco Use?:  Yes


Tobacco type used:  Cigarettes


Smoking Status:  Current Everyday Smoker


Smokeless Tobacco Frequency:  Former User


Use of E-Cig and/or Vaping dev:  No


Substance use?:  No


Alcohol Use?:  Yes


Alcohol type:  Hard Liquor


Alcohol Frequency:  Once in a while


Pt stated abuse/neglect:  No





Immunizations Up To Date


Influenza Vaccine Up-to-Date:  No; Not Current


First/Initial COVID19 Vaccinat:  NO


Second COVID19 Vaccination Parmjit:  NO


Tetanus Booster (TDap):  More Than 5 Years


Hepatitis A:  No


Hepatitis B:  No


TB Skin Test:  None





Current Status


Advance Directives:  No


Communicates:  Verbally


Primary Language:  English


Preferred Spoken Language:  English


Is interpretation needed?:  No


Implanted or [employer] D:  Stents





Review of Systems


Constitutional:  see HPI


EENTM:  see HPI


Respiratory:  see HPI


Cardiovascular:  see HPI


Gastrointestinal:  see HPI


Genitourinary:  see HPI


Musculoskeletal:  see HPI


Skin:  see HPI


Psychiatric/Neurological:  See HPI





Focused Exam


Height, Weight, BMI


Height: 5'11.00"


Weight: 222lbs. 0.0oz. 100.077309cz; 30.37 BMI


Method:Stated





Exam


Exam


Patient acknowledged, consented, and participated in this virtual visit which wa

s conducted using real time audio/video


Vital Signs








  Date Time  Temp Pulse Resp B/P (MAP) Pulse Ox O2 Delivery O2 Flow Rate FiO2


 


10/30/22 22:18     100 Room Air  


 


10/30/22 21:40 36.4 63   100   21


 


10/30/22 21:21 36.4 63 15 144/84 (104) 100 Room Air  


 


10/30/22 21:21 36.4 63 15 144/84 (104) 100 Room Air  


 


10/30/22 21:14  70      


 


10/30/22 20:59 34.7 61 16 108/79 100 Room Air  


 


10/30/22 20:56 34.7 103      





 34.7       


 


10/30/22 20:26 34.6 70      





 34.7       





 34.7       


 


10/30/22 19:38  62  114/70 (85) 94 Room Air  


 


10/30/22 19:06 34.8 81 16 105/99 (101) 94 Room Air  








Height & Weight


Height: 5'11.00"


Weight: 222lbs. 0.0oz. 100.225505ds; 30.37 BMI


Method:Stated


General Appearance:  No Apparent Distress, WD/WN


Capillary Refill:  Less Than 3 Seconds





Results


Lab


Laboratory Tests


10/30/22 19:25











Assessment/Plan


Assessment/Plan


Please see above











TONNY SOLIS MD                Oct 30, 2022 22:55

## 2022-10-30 NOTE — ED GI
General


Chief Complaint:  Abdominal/GI Problems


Stated Complaint:  POSSIBLE GI BLEED


Source of Information:  Patient, EMS


Exam Limitations:  No Limitations





History of Present Illness


Date Seen by Provider:  Oct 30, 2022


Time Seen by Provider:  19:03


Initial Comments


72-year-old male presents the emergency department today for rectal bleeding.  

He had a colonoscopy with polypectomy x9 on 10/25.  Today he was going to golf 

and passed out next to his vehicle.  He was found in a large pool of blood 

according to EMS.  Patient himself states rectal bleeding was abrupt.  He has 

diffuse abdominal tenderness.  Initial blood pressure was reportedly in the 60s 

systolic.  He received 2 to 500 cc of crystalloid during transport.  He tells me

he is supposed to be on blood thinning medications but has not had any of his 

medications in at least a month due to financial issues.  He does tell me that 

he had some abdominal pain while he was [place] the [place] and somebody advised 

him to take nitroglycerin, which he did.





Allergies and Home Medications


Allergies


Coded Allergies:  


     No Known Drug Allergies (Unverified , 10/5/17)





Patient Home Medication List


Home Medication List Reviewed:  Yes


Albuterol Sulfate (Proair Hfa) 1 Puff Puff, 2 PUFF IH Q4H PRN for SHORTNESS OF 

BREATH, (Reported)


   Entered as Reported by: GABRIELA VELASQUEZ on 10/11/17 0836


Levothyroxine Sodium (Levothyroxine Sodium) 137 Mcg Tablet, 137 MCG PO DAILY, 

(Reported)


   Entered as Reported by: ANGELA SAUER on 10/5/17 1035


Metoprolol Tartrate (Metoprolol Tartrate) 25 Mg Tablet, 25 MG PO BID, (Reported)


   Entered as Reported by: ANGELA SAUER on 10/5/17 1035





Review of Systems


Review of Systems


Constitutional:  no symptoms reported


EENTM:  No Symptoms Reported


Respiratory:  No Symptoms Reported


Cardiovascular:  No Symptoms Reported


Gastrointestinal:  Abdominal Pain, Rectal Bleeding


Genitourinary:  No Symptoms Reported


Musculoskeletal:  no symptoms reported


Skin:  no symptoms reported


Psychiatric/Neurological:  No Symptoms Reported


Endocrine:  No Symptoms Reported


Hematologic/Lymphatic:  No Symptoms Reported





Past Medical-Social-Family Hx


Patient Social History


Tobacco Use?:  No


Use of E-Cig and/or Vaping dev:  No


Substance use?:  No


Alcohol Use?:  Yes





Immunizations Up To Date


Tetanus Booster (TDap):  Unknown


First/Initial COVID19 Vaccinat:  NO


Second COVID19 Vaccination Parmjit:  NO


Third COVID19 Vaccination Date:  NO





Seasonal Allergies


Seasonal Allergies:  Yes





Past Medical History


Surgeries:  Yes (CARDIAC BYPASS 2012, CARDIAC STENTS PRIOR TO BYPASS)


Respiratory:  Yes


COPD


Cardiac:  Yes (STENTS THEN BYPASS)


Coronary Artery Disease, Hypertension, Irregular Heartbeat


Neurological:  No


Reproductive Disorders:  No


Sexually Transmitted Disease:  No


HIV/AIDS:  No


Genitourinary:  No


Gastrointestinal:  Yes (WT LOSS)


Musculoskeletal:  Yes


Arthritis, Chronic Back Pain


Endocrine:  Yes


Hypothyroidsim


HEENT:  Yes


Tinnitis


Loss of Vision:  Bilateral


Hearing Impairment:  Denies


Cancer:  No


Psychosocial:  No


Integumentary:  No


Blood Disorders:  No


Adverse Reaction/Blood Tranf:  No





Family Medical History


Reviewed Nursing Family Hx


No Pertinent Family Hx





Physical Exam


Vital Signs





Vital Signs - First Documented








 10/30/22





 19:06


 


Temp 34.8


 


Pulse 81


 


Resp 16


 


B/P (MAP) 105/99 (101)


 


Pulse Ox 94


 


O2 Delivery Room Air





Capillary Refill :


Height/Weight/BMI


Height: 5'11.00"


Weight: 222lbs. 0.0oz. 100.462350qp; 28.33 BMI


Method:Stated


General Appearance:  WD/WN, no apparent distress


HEENT:  normal ENT inspection, pharynx normal


Neck:  non-tender, full range of motion, supple, normal inspection


Respiratory:  chest non-tender, lungs clear, normal breath sounds, no 

respiratory distress, no accessory muscle use


Cardiovascular:  regular rate, rhythm, no edema, no gallop, no JVD, no murmur


Gastrointestinal:  normal bowel sounds, soft, no organomegaly, other (Large-

volume rectal bleeding.  Diffuse abdominal tenderness with voluntary guarding.)


Extremities:  normal range of motion, non-tender, normal inspection, no pedal 

edema, no calf tenderness, normal capillary refill


Back:  normal inspection, no CVA tenderness, no vertebral tenderness


Neurologic/Psychiatric:  alert, normal mood/affect, oriented x 3


Skin:  normal color, warm/dry


Lymphatic:  no adenopathy





Progress/Results/Core Measures


Results/Orders


Lab Results





Laboratory Tests








Test


 10/30/22


19:25 Range/Units


 


 


White Blood Count


 9.0 


 4.3-11.0


10^3/uL


 


Red Blood Count


 3.15 L


 4.30-5.52


10^6/uL


 


Hemoglobin 11.1 L 13.3-17.7  g/dL


 


Hematocrit 31 L 40-54  %


 


Mean Corpuscular Volume 100 H 80-99  fL


 


Mean Corpuscular Hemoglobin 35 H 25-34  pg


 


Mean Corpuscular Hemoglobin


Concent 35 


 32-36  g/dL





 


Red Cell Distribution Width 14.1  10.0-14.5  %


 


Platelet Count


 151 


 130-400


10^3/uL


 


Mean Platelet Volume 10.7  9.0-12.2  fL


 


Immature Granulocyte % (Auto) 1   %


 


Neutrophils (%) (Auto) 50  42-75  %


 


Lymphocytes (%) (Auto) 36  12-44  %


 


Monocytes (%) (Auto) 10  0-12  %


 


Eosinophils (%) (Auto) 3  0-10  %


 


Basophils (%) (Auto) 1  0-10  %


 


Neutrophils # (Auto)


 4.4 


 1.8-7.8


10^3/uL


 


Lymphocytes # (Auto)


 3.2 


 1.0-4.0


10^3/uL


 


Monocytes # (Auto)


 0.9 


 0.0-1.0


10^3/uL


 


Eosinophils # (Auto)


 0.2 


 0.0-0.3


10^3/uL


 


Basophils # (Auto)


 0.1 


 0.0-0.1


10^3/uL


 


Immature Granulocyte # (Auto)


 0.1 


 0.0-0.1


10^3/uL


 


Sodium Level 135  135-145  MMOL/L


 


Potassium Level 3.1 L 3.6-5.0  MMOL/L


 


Chloride Level 113 H   MMOL/L


 


Carbon Dioxide Level 15 L 21-32  MMOL/L


 


Anion Gap 7  5-14  MMOL/L


 


Blood Urea Nitrogen 12  7-18  MG/DL


 


Creatinine


 1.26 


 0.60-1.30


MG/DL


 


Estimat Glomerular Filtration


Rate 60 


  





 


BUN/Creatinine Ratio 10   


 


Glucose Level 95    MG/DL


 


Calcium Level 7.1 L 8.5-10.1  MG/DL


 


Corrected Calcium 7.9 L 8.5-10.1  MG/DL


 


Total Bilirubin 0.3  0.1-1.0  MG/DL


 


Aspartate Amino Transf


(AST/SGOT) 13 


 5-34  U/L





 


Alanine Aminotransferase


(ALT/SGPT) 9 


 0-55  U/L





 


Alkaline Phosphatase 35 L   U/L


 


Total Protein 5.0 L 6.4-8.2  GM/DL


 


Albumin 3.0 L 3.2-4.5  GM/DL








My Orders





Orders - DREW TUCKER DO


Comprehensive Metabolic Panel (10/30/22 19:13)


Cbc With Automated Diff (10/30/22 19:13)


Abdomen, Flat & Upright/Decub (10/30/22 19:13)


Blood Products Transfusion: Re (10/30/22 19:14)


Ns Iv 1000 Ml (Sodium Chloride 0.9%) (10/30/22 19:12)


Ns Iv 500 Ml (Sodium Chloride 0.9%) (10/30/22 19:28)


Type And Screen (10/30/22 19:25)





Medications Given in ED





Current Medications








 Medications  Dose


 Ordered  Sig/Reji


 Route  Start Time


 Stop Time Status Last Admin


Dose Admin


 


 Sodium Chloride  500 ml @ ud  STK-MED ONCE


 .ROUTE  10/30/22 19:28


 10/30/22 19:31 DC 10/30/22 19:32


100 MLS/HR


 


 Sodium Chloride  1,000 ml @ 


 ud  STK-MED ONCE


 .ROUTE  10/30/22 19:12


 10/30/22 19:16 DC 10/30/22 19:30


999 MLS/HR








Vital Signs/I&O











 10/30/22 10/30/22





 19:06 19:38


 


Temp 34.8 


 


Pulse 81 62


 


Resp 16 


 


B/P (MAP) 105/99 (101) 114/70 (85)


 


Pulse Ox 94 94


 


O2 Delivery Room Air Room Air











Diagnostic Imaging





   Diagonstic Imaging:  Xray


   Plain Films/CT/US/NM/MRI:  abdomen


Comments


No free air





Critical Care Note


Critical Care


Start Time:  19:05


Stop Time:  20:30


Total Time (minutes)


85





Departure


Communication (Admissions)


Contacted Dr. Baker immediately upon patient arrival due to instability and 

possible surgical intervention.  Requests hemoglobin, KUB and return call.  Once

completed call him back and the bleeding had improved significantly as have 

blood pressure.  I think blood pressure is lower at least in part secondary to 

nitroglycerin use as a rebound and rapidly even prior to blood administration.  

He was given 2 units of PRBCs, 1 L of normal saline.  His hemoglobin has dropped

from 16 to 11 in the span of a month.  He is having minimal active bleeding at 

this time.  He is remained alert, oriented and is stable for admission at this 

time with a guarded prognosis.  I spoke with Dr. Teresa who accepts admission.  

Dr. Segura request Dr. Rodriguez to be consult however he will take calls and the 

patient overnight I spoke with the patient's family and updated them on the plan

of care.  Patient and family are comfortable agreeable current plan of care.





Impression





   Primary Impression:  


   Rectal bleeding


   Additional Impression:  


   Hemorrhagic shock


Disposition:  09 ADMITTED AS INPATIENT


Condition:  Improved





Departure-Patient Inst.


Referrals:  


KEIKO WOOTEN MD (PCP/Family)


Primary Care Physician











DREW TUCKER DO            Oct 30, 2022 19:17

## 2022-10-31 LAB
ALBUMIN SERPL-MCNC: 3.1 GM/DL (ref 3.2–4.5)
ALP SERPL-CCNC: 37 U/L (ref 40–136)
ALT SERPL-CCNC: 10 U/L (ref 0–55)
BASOPHILS # BLD AUTO: 0.1 10^3/UL (ref 0–0.1)
BASOPHILS NFR BLD AUTO: 1 % (ref 0–10)
BILIRUB SERPL-MCNC: 0.8 MG/DL (ref 0.1–1)
BUN/CREAT SERPL: 10
CALCIUM SERPL-MCNC: 7.6 MG/DL (ref 8.5–10.1)
CHLORIDE SERPL-SCNC: 110 MMOL/L (ref 98–107)
CO2 SERPL-SCNC: 19 MMOL/L (ref 21–32)
CREAT SERPL-MCNC: 1.01 MG/DL (ref 0.6–1.3)
EOSINOPHIL # BLD AUTO: 0.2 10^3/UL (ref 0–0.3)
EOSINOPHIL NFR BLD AUTO: 2 % (ref 0–10)
GFR SERPLBLD BASED ON 1.73 SQ M-ARVRAT: 79 ML/MIN
GLUCOSE SERPL-MCNC: 87 MG/DL (ref 70–105)
HCT VFR BLD CALC: 34 % (ref 40–54)
HCT VFR BLD CALC: 35 % (ref 40–54)
HGB BLD-MCNC: 11.6 G/DL (ref 13.3–17.7)
HGB BLD-MCNC: 11.9 G/DL (ref 13.3–17.7)
LYMPHOCYTES # BLD AUTO: 2.5 10^3/UL (ref 1–4)
LYMPHOCYTES NFR BLD AUTO: 27 % (ref 12–44)
MAGNESIUM SERPL-MCNC: 1.7 MG/DL (ref 1.6–2.4)
MANUAL DIFFERENTIAL PERFORMED BLD QL: NO
MCH RBC QN AUTO: 33 PG (ref 25–34)
MCH RBC QN AUTO: 34 PG (ref 25–34)
MCHC RBC AUTO-ENTMCNC: 34 G/DL (ref 32–36)
MCHC RBC AUTO-ENTMCNC: 34 G/DL (ref 32–36)
MCV RBC AUTO: 98 FL (ref 80–99)
MCV RBC AUTO: 98 FL (ref 80–99)
MONOCYTES # BLD AUTO: 0.7 10^3/UL (ref 0–1)
MONOCYTES NFR BLD AUTO: 8 % (ref 0–12)
NEUTROPHILS # BLD AUTO: 5.7 10^3/UL (ref 1.8–7.8)
NEUTROPHILS NFR BLD AUTO: 62 % (ref 42–75)
PHOSPHATE SERPL-MCNC: 2.7 MG/DL (ref 2.3–4.7)
PLATELET # BLD: 124 10^3/UL (ref 130–400)
PLATELET # BLD: 139 10^3/UL (ref 130–400)
PMV BLD AUTO: 10.3 FL (ref 9–12.2)
PMV BLD AUTO: 10.6 FL (ref 9–12.2)
POTASSIUM SERPL-SCNC: 4.2 MMOL/L (ref 3.6–5)
PROT SERPL-MCNC: 5.3 GM/DL (ref 6.4–8.2)
SODIUM SERPL-SCNC: 136 MMOL/L (ref 135–145)
WBC # BLD AUTO: 11.9 10^3/UL (ref 4.3–11)
WBC # BLD AUTO: 9.2 10^3/UL (ref 4.3–11)

## 2022-10-31 RX ADMIN — SODIUM CHLORIDE AND POTASSIUM CHLORIDE SCH MLS/HR: 9; 1.49 INJECTION, SOLUTION INTRAVENOUS at 13:31

## 2022-10-31 RX ADMIN — SODIUM CHLORIDE SCH MLS/HR: 900 INJECTION, SOLUTION INTRAVENOUS at 01:21

## 2022-10-31 RX ADMIN — POTASSIUM CHLORIDE SCH MEQ: 1500 TABLET, EXTENDED RELEASE ORAL at 06:00

## 2022-10-31 RX ADMIN — POTASSIUM CHLORIDE SCH MLS/HR: 200 INJECTION, SOLUTION INTRAVENOUS at 06:00

## 2022-10-31 RX ADMIN — MAGNESIUM SULFATE IN DEXTROSE SCH MLS/HR: 10 INJECTION, SOLUTION INTRAVENOUS at 06:00

## 2022-10-31 RX ADMIN — PANTOPRAZOLE SODIUM SCH MG: 40 INJECTION, POWDER, FOR SOLUTION INTRAVENOUS at 08:20

## 2022-10-31 RX ADMIN — SODIUM CHLORIDE SCH MLS/HR: 900 INJECTION, SOLUTION INTRAVENOUS at 13:31

## 2022-10-31 RX ADMIN — PANTOPRAZOLE SODIUM SCH MG: 40 INJECTION, POWDER, FOR SOLUTION INTRAVENOUS at 21:54

## 2022-10-31 NOTE — TELE-ICU PROGRESS NOTE
Subjective


Date Seen by a Provider:  Oct 31, 2022


Time Seen by a Provider:  12:01


Subjective/Events-last exam











(Tele-ICU Physician , Progress Note )


Service provided via interactive audio and video telecommunications  E-CARE 

system to a patient admitted to ICU bed in [facility].





Available chart/ vitals / labs / Images reviewed


Video assessment done using   teleICU camera, rest of exam as per RN


Discussed with RN


Events overnight : 





Afebrile


hemodynamically stable


Respiratory - 


I/O =





Drips: ns 75 with K 


Pressors- no





Consultants: sx


Hospital course:


(10/30) 72/M admitted with lower GI bleed , hemorragic shock ,  2 units pRBCs








A/P


hemorragic shock with GIB 


- BP imperoved with transfusions





ABLA


-  rectal bleeding 


- s/p 2 colonoscopies in the last 2 months where he had 20+ polyps removed, the 

most recent colonoscopy was on 10/25.


- follow Sx 


-Hgb stable at 11 but received 2 units pRBCs


-NPO 


-off anticoagulation PTA e for a few months, continue to hold





 CAD s/p CABG


- stable 





COPD 


 - stable ,  CPM 





Lines :   periph   , (Central Line Necessity Reviewed)


Humphrey: void


OG:


Nutrition:


Analgesia:


Anxiety/ delirium





VTE Prophylaxis: scd


Stress Ulcer Prophylaxis: ppi 40 bid








Plans in collaboration with   bedside consultants and IM MDs.


Discussed with RN to reach out if any questions or concerns








A total of 25  minutes of critical care time was devoted to this patient today, 

required to treat and/or prevent further deterioration of critical care 

condition ( as above ) .








I am remotely monitoring this patient from another state.  I am unable to do the

bedside exam, and history/physical and pertinent information is taken from other

notes in the computer and bedside staff.





Sepsis Event


Evaluation


Height, Weight, BMI


Height: 5'11.00"


Weight: 222lbs. 0.0oz. 100.203237ad; 30.83 BMI


Method:Stated





Exam


Exam


Patient acknowledged, consented, and participated in this virtual visit which wa

s conducted using real time audio/video


Vital Signs








  Date Time  Temp Pulse Resp B/P (MAP) Pulse Ox O2 Delivery O2 Flow Rate FiO2


 


10/31/22 11:00  57 25 97/59 (72) 95 Room Air  


 


10/31/22 10:00  50 21 95/65 (75) 89 Room Air  


 


10/31/22 09:00  55 14 95/59 (71) 93 Room Air  


 


10/31/22 08:00     96 Room Air  


 


10/31/22 08:00  55 15 118/73 (88) 96 Room Air  


 


10/31/22 08:00 37.0       


 


10/31/22 07:09  49      


 


10/31/22 07:00  61 17 76/54 (61) 95 Room Air  


 


10/31/22 06:00  86 20 86/55 (65) 95 Room Air  


 


10/31/22 05:00  59 10 105/58 (71) 95 Room Air  


 


10/31/22 04:00  55 8 96/61 (72) 97 Room Air  


 


10/31/22 04:00     96 Room Air  


 


10/31/22 03:15  57 10 95/51 (66) 97 Room Air  


 


10/31/22 02:45  56 15 83/56 (65) 96 Room Air  


 


10/31/22 01:30  56 13 87/54 (65) 98 Room Air  


 


10/31/22 01:00  59      


 


10/31/22 00:09  60 13 85/58 (67) 94 Room Air  


 


10/30/22 23:59     95 Room Air  


 


10/30/22 23:07  77 16 117/64 (73) 100 Room Air  


 


10/30/22 22:45  61 8 133/71 (90) 100 Room Air  


 


10/30/22 22:30  65 8 143/76 (96) 100 Room Air  


 


10/30/22 22:18     100 Room Air  


 


10/30/22 22:15  66 14 142/77 (110) 99 Room Air  


 


10/30/22 22:00  65 12 132/72 (102) 100 Room Air  


 


10/30/22 21:45  65 12 134/76 (96) 99 Room Air  


 


10/30/22 21:40 36.4 63   100   21


 


10/30/22 21:30  65 15 126/62 (98) 100 Room Air  


 


10/30/22 21:25     96 Room Air  


 


10/30/22 21:21 36.4 63 15 144/84 (104) 100 Room Air  


 


10/30/22 21:21 36.4 63 15 144/84 (104) 100 Room Air  


 


10/30/22 21:14  70      


 


10/30/22 20:59 34.7 61 16 108/79 100 Room Air  


 


10/30/22 20:56 34.7 103      





 34.7       


 


10/30/22 20:26 34.6 70      





 34.7       





 34.7       


 


10/30/22 19:38  62  114/70 (85) 94 Room Air  


 


10/30/22 19:06 34.8 81 16 105/99 (101) 94 Room Air  














I & O 


 


 10/31/22





 07:00


 


Intake Total 4500 ml


 


Output Total 0 ml


 


Balance 4500 ml








Height & Weight


Height: 5'11.00"


Weight: 222lbs. 0.0oz. 100.579378in; 30.83 BMI


Method:Stated


General Appearance:  No Apparent Distress, WD/WN


HEENT:  PERRL/EOMI, Moist Mucous Membranes; No Scleral Icterus (L), No Scleral 

Icterus (R)


Neck:  Normal Inspection, Supple


Respiratory:  Lungs Clear, No Accessory Muscle Use, No Respiratory Distress


Cardiovascular:  Regular Rate, Rhythm, No Murmur


Capillary Refill:  Less Than 3 Seconds


Extremity:  Normal Capillary Refill, No Calf Tenderness, No Pedal Edema


Neurologic/Psychiatric:  Alert, Oriented x3, Normal Mood/Affect


Skin:  Normal Color, Warm/Dry





Results


Lab


Laboratory Tests


10/30/22 19:25








10/31/22 01:14








10/31/22 05:02











Assessment/Plan


Assessment/Plan


1











RAMSEY ROTHMAN MD         Oct 31, 2022 12:05

## 2022-10-31 NOTE — CONSULTATION - SURGERY
History of Present Illness


History of Present Illness


Patient Consulted On(josue/time)


10/31/22


 15:38


Date Seen by Provider:  Oct 31, 2022


Time Seen by Provider:  08:32


History of Present Illness


Patient 72 year old male had recent colonoscopy c biopsies.  Was doing fine and 

was playing golf yesterday began having multiple bloody bowel movements.  He has

chronic abdominal pain, which this is unchanged.


Patient got light headed and couldn't get off the ground after putting cart 

away.  Was there about an hour.  Thought maybe cardiac so took a nitro pill he 

states.  Blood pressure was in the 60's systoslic.  Patient received fluid and 2

units


prbc.  Things have improved this morning he states.  Denies n/v fever sweats 

chills shortness of breath or chest pain.





Allergies and Home Medications


Allergies


Coded Allergies:  


     No Known Drug Allergies (Unverified , 10/5/17)





Patient Home Medication List


Home Medication List Reviewed:  Yes


No Active Prescriptions or Reported Meds





Past Medical-Social-Family Hx


Patient Social History


Smoking Status:  Current Everyday Smoker


Type Used:  Cigarettes


2nd Hand Smoke Exposure:  No


Recent Hopitalizations:  No


Alcohol Use?:  Yes


Have you traveled recently?:  No





Immunizations Up To Date


Tetanus Booster (TDap):  Unknown





Seasonal Allergies


Seasonal Allergies:  Yes





Surgeries


History of Surgeries:  Yes (CARDIAC BYPASS 2012, CARDIAC STENTS PRIOR TO BYPASS)


Surgeries:  CABG





Respiratory


History of Respiratory Disorde:  Yes


Respiratory Disorders:  COPD





Cardiovascular


History of Cardiac Disorders:  Yes (STENTS THEN BYPASS)


Cardiac Disorders:  Coronary Artery Disease, Hypertension, Irregular Heartbeat





Neurological


History of Neurological Disord:  No





Reproductive System


Hx Reproductive Disorders:  No


Sexually Transmitted Disease:  No


HIV/AIDS:  No





Genitourinary


History of Genitourinary Disor:  No





Gastrointestinal


History of Gastrointestinal Di:  Yes (WT LOSS)





Musculoskeletal


History of Musculoskeletal Dis:  Yes


Musculoskeletal Disorders:  Arthritis, Chronic Back Pain





Endocrine


History of Endocrine Disorders:  Yes


Endocrine Disorders:  Hypothyroidsim





HEENT


History of HEENT Disorders:  Yes


HEENT Disorders:  Tinnitis


Loss of Vision:  Bilateral


Hearing Impairment:  Denies





Cancer


History of Cancer:  No





Psychosocial


History of Psychiatric Problem:  No





Integumentary


History of Skin or Integumenta:  No





Blood Transfusions


History of Blood Disorders:  No


Adverse Reaction to a Blood Tr:  No





Reviewed Nursing Assessment


Reviewed/Agree w Nursing PMH:  Yes





Family Medical History


Significant Family History:  No Pertinent Family Hx





Review of Systems-General


Constitutional:  No chills; weakness


EENTM:  No blurred vision, No double vision


Respiratory:  No cough, No dyspnea on exertion


Cardiovascular:  No chest pain, No palpitations


Gastrointestinal:  abdominal pain, melena; No nausea, No vomiting


Genitourinary:  No decreased output, No discharge


Musculoskeletal:  No back pain, No joint pain


Skin:  No change in color, No change in hair/nails


Psychiatric/Neurological:  Denies Anxiety, Denies Depressed, Denies Emotional 

Problems


All Other Systems Reviewed


Negative Unless Noted:  Yes (Negative excepted noted.)





Physical Exam-General Problems


Physical Exam


Vital Signs





Vital Signs - First Documented








 10/30/22 10/30/22





 19:06 21:40


 


Temp 34.8 


 


Pulse 81 


 


Resp 16 


 


B/P (MAP) 105/99 (101) 


 


Pulse Ox 94 


 


O2 Delivery Room Air 


 


FiO2  21





Capillary Refill : Less Than 3 Seconds


General Appearance:  WD/WN, no apparent distress


HEENT:  PERRL/EOMI, normal ENT inspection


Neck:  non-tender, supple


Respiratory:  chest non-tender, no respiratory distress, no accessory muscle use


Cardiovascular:  regular rate, rhythm


Gastrointestinal:  non tender, soft


Back:  normal inspection, no CVA tenderness


Extremities:  non-tender


Neurologic/Psychiatric:  alert, normal mood/affect, oriented x 3


Skin:  normal color, warm/dry


Lymphatic:  no adenopathy





Data Review


Labs


Laboratory Tests


10/30/22 19:25: 


White Blood Count 9.0, Red Blood Count 3.15L, Hemoglobin 11.1L, Hematocrit 31L, 

Mean Corpuscular Volume 100H, Mean Corpuscular Hemoglobin 35H, Mean Corpuscular 

Hemoglobin Concent 35, Red Cell Distribution Width 14.1, Platelet Count 151, 

Mean Platelet Volume 10.7, Immature Granulocyte % (Auto) 1, Neutrophils (%) 

(Auto) 50, Lymphocytes (%) (Auto) 36, Monocytes (%) (Auto) 10, Eosinophils (%) 

(Auto) 3, Basophils (%) (Auto) 1, Neutrophils # (Auto) 4.4, Lymphocytes # (Auto)

3.2, Monocytes # (Auto) 0.9, Eosinophils # (Auto) 0.2, Basophils # (Auto) 0.1, 

Immature Granulocyte # (Auto) 0.1, Sodium Level 135, Potassium Level 3.1L, 

Chloride Level 113H, Carbon Dioxide Level 15L, Anion Gap 7, Blood Urea Nitrogen 

12, Creatinine 1.26, Estimat Glomerular Filtration Rate 60, BUN/Creatinine Ratio

10, Glucose Level 95, Calcium Level 7.1L, Corrected Calcium 7.9L, Total 

Bilirubin 0.3, Aspartate Amino Transf (AST/SGOT) 13, Alanine Aminotransferase 

(ALT/SGPT) 9, Alkaline Phosphatase 35L, Total Protein 5.0L, Albumin 3.0L


10/31/22 01:14: 


White Blood Count 11.9H, Red Blood Count 3.62L, Hemoglobin 11.9L, Hematocrit 35L

, Mean Corpuscular Volume 98, Mean Corpuscular Hemoglobin 33, Mean Corpuscular 

Hemoglobin Concent 34, Red Cell Distribution Width 18.5H, Platelet Count 139, 

Mean Platelet Volume 10.3, Percent Immature Platelet Fraction 8.1H


10/31/22 05:02: 


White Blood Count 9.2, Red Blood Count 3.46L, Hemoglobin 11.6L, Hematocrit 34L, 

Mean Corpuscular Volume 98, Mean Corpuscular Hemoglobin 34, Mean Corpuscular 

Hemoglobin Concent 34, Red Cell Distribution Width 18.8H, Platelet Count 124L, 

Mean Platelet Volume 10.6, Immature Granulocyte % (Auto) 1, Neutrophils (%) 

(Auto) 62, Lymphocytes (%) (Auto) 27, Monocytes (%) (Auto) 8, Eosinophils (%) 

(Auto) 2, Basophils (%) (Auto) 1, Neutrophils # (Auto) 5.7, Lymphocytes # (Auto)

2.5, Monocytes # (Auto) 0.7, Eosinophils # (Auto) 0.2, Basophils # (Auto) 0.1, 

Immature Granulocyte # (Auto) 0.1, Sodium Level 136, Potassium Level 4.2, 

Chloride Level 110H, Carbon Dioxide Level 19L, Anion Gap 7, Blood Urea Nitrogen 

10, Creatinine 1.01, Estimat Glomerular Filtration Rate 79, BUN/Creatinine Ratio

10, Glucose Level 87, Calcium Level 7.6L, Corrected Calcium 8.3L, Total 

Bilirubin 0.8, Aspartate Amino Transf (AST/SGOT) 16, Alanine Aminotransferase 

(ALT/SGPT) 10, Alkaline Phosphatase 37L, Total Protein 5.3L, Albumin 3.1L, 

Percent Immature Platelet Fraction 7.0, Phosphorus Level 2.7, Magnesium Level 

1.7


10/31/22 08:53: Lab Scanned Report Transfusion Reaction Form


10/31/22 12:49: Lab Scanned Report Transfusion Reaction Form





Assessment/Plan


lower gi bleed


hypotension


s/p colonoscopy c polypectomies








patient transfused, follow hgb and transfuse as needed


npo


iv fluids


suspect polypectomy site bleed-conservative measures, if continues, may need to 

repeat colonoscopy











CHARITO MENDIOLA DO              Oct 31, 2022 15:44

## 2022-11-01 VITALS — SYSTOLIC BLOOD PRESSURE: 145 MMHG | DIASTOLIC BLOOD PRESSURE: 73 MMHG

## 2022-11-01 VITALS — SYSTOLIC BLOOD PRESSURE: 136 MMHG | DIASTOLIC BLOOD PRESSURE: 63 MMHG

## 2022-11-01 VITALS — SYSTOLIC BLOOD PRESSURE: 121 MMHG | DIASTOLIC BLOOD PRESSURE: 58 MMHG

## 2022-11-01 LAB
ALBUMIN SERPL-MCNC: 3.6 GM/DL (ref 3.2–4.5)
ALP SERPL-CCNC: 41 U/L (ref 40–136)
ALT SERPL-CCNC: 11 U/L (ref 0–55)
BASOPHILS # BLD AUTO: 0.1 10^3/UL (ref 0–0.1)
BASOPHILS NFR BLD AUTO: 1 % (ref 0–10)
BILIRUB SERPL-MCNC: 0.9 MG/DL (ref 0.1–1)
BUN/CREAT SERPL: 9
CALCIUM SERPL-MCNC: 8.6 MG/DL (ref 8.5–10.1)
CHLORIDE SERPL-SCNC: 109 MMOL/L (ref 98–107)
CO2 SERPL-SCNC: 18 MMOL/L (ref 21–32)
CREAT SERPL-MCNC: 1.1 MG/DL (ref 0.6–1.3)
EOSINOPHIL # BLD AUTO: 0.3 10^3/UL (ref 0–0.3)
EOSINOPHIL NFR BLD AUTO: 3 % (ref 0–10)
GFR SERPLBLD BASED ON 1.73 SQ M-ARVRAT: 71 ML/MIN
GLUCOSE SERPL-MCNC: 87 MG/DL (ref 70–105)
HCT VFR BLD CALC: 38 % (ref 40–54)
HGB BLD-MCNC: 12.8 G/DL (ref 13.3–17.7)
LYMPHOCYTES # BLD AUTO: 3.3 10^3/UL (ref 1–4)
LYMPHOCYTES NFR BLD AUTO: 40 % (ref 12–44)
MAGNESIUM SERPL-MCNC: 1.9 MG/DL (ref 1.6–2.4)
MANUAL DIFFERENTIAL PERFORMED BLD QL: NO
MCH RBC QN AUTO: 33 PG (ref 25–34)
MCHC RBC AUTO-ENTMCNC: 33 G/DL (ref 32–36)
MCV RBC AUTO: 99 FL (ref 80–99)
MONOCYTES # BLD AUTO: 0.7 10^3/UL (ref 0–1)
MONOCYTES NFR BLD AUTO: 8 % (ref 0–12)
NEUTROPHILS # BLD AUTO: 3.9 10^3/UL (ref 1.8–7.8)
NEUTROPHILS NFR BLD AUTO: 47 % (ref 42–75)
PHOSPHATE SERPL-MCNC: 2.5 MG/DL (ref 2.3–4.7)
PLATELET # BLD: 142 10^3/UL (ref 130–400)
PMV BLD AUTO: 10.7 FL (ref 9–12.2)
POTASSIUM SERPL-SCNC: 4.2 MMOL/L (ref 3.6–5)
PROT SERPL-MCNC: 6.1 GM/DL (ref 6.4–8.2)
SODIUM SERPL-SCNC: 137 MMOL/L (ref 135–145)
WBC # BLD AUTO: 8.2 10^3/UL (ref 4.3–11)

## 2022-11-01 RX ADMIN — PANTOPRAZOLE SODIUM SCH MG: 40 INJECTION, POWDER, FOR SOLUTION INTRAVENOUS at 08:45

## 2022-11-01 RX ADMIN — POTASSIUM CHLORIDE SCH MEQ: 1500 TABLET, EXTENDED RELEASE ORAL at 06:00

## 2022-11-01 RX ADMIN — SODIUM CHLORIDE SCH MLS/HR: 900 INJECTION, SOLUTION INTRAVENOUS at 08:44

## 2022-11-01 RX ADMIN — MAGNESIUM SULFATE IN DEXTROSE SCH MLS/HR: 10 INJECTION, SOLUTION INTRAVENOUS at 06:00

## 2022-11-01 RX ADMIN — POTASSIUM CHLORIDE SCH MLS/HR: 200 INJECTION, SOLUTION INTRAVENOUS at 06:00

## 2022-11-01 RX ADMIN — PANTOPRAZOLE SODIUM SCH MG: 40 INJECTION, POWDER, FOR SOLUTION INTRAVENOUS at 20:54

## 2022-11-01 RX ADMIN — SODIUM CHLORIDE AND POTASSIUM CHLORIDE SCH MLS/HR: 9; 1.49 INJECTION, SOLUTION INTRAVENOUS at 02:55

## 2022-11-01 NOTE — PROGRESS NOTE - SURGERY
[facility]


Subjective


Date Seen by a Provider:  Nov 1, 2022


Subjective/Events-last exam


Pt is describing unchanged abdominal pain in a band across his lower abdomen and

states he is hungry. He is able to walk to the toilet and is urinating without 

issue. He has not had a BM and denies nausea or vomiting.


Review of Systems


General:  No Chills, No Night Sweats; Appetite


HEENT:  No Head Aches


Pulmonary:  No Dyspnea


Gastrointestinal:  Abdominal Pain





Objective


Exam





Vital Signs








  Date Time  Temp Pulse Resp B/P (MAP) Pulse Ox O2 Delivery O2 Flow Rate FiO2


 


11/1/22 06:00  52 9 118/72 (87) 93 Room Air  


 


11/1/22 05:00  64  136/99 (111) 94 Room Air  


 


11/1/22 04:00     95 Room Air  


 


11/1/22 04:00  55 10 118/72 (87) 92 Room Air  


 


11/1/22 03:00  47 11 94/60 (74) 93 Room Air  


 


11/1/22 02:00  49 13 102/57 (85) 93 Room Air  


 


11/1/22 01:00  54      


 


11/1/22 01:00  54 19 95/57 (75) 91 Room Air  


 


11/1/22 00:00  51 12 96/57 (70) 92 Room Air  


 


10/31/22 23:59     94 Room Air  


 


10/31/22 23:00  63 24 97/61 (82) 92 Room Air  


 


10/31/22 22:00  49 13 113/56 (72) 92 Room Air  


 


10/31/22 21:00  52 10 90/72 (75) 92 Room Air  


 


10/31/22 20:00 36.4       


 


10/31/22 20:00  64 16 122/63 (86) 93 Room Air  


 


10/31/22 20:00     95 Room Air  


 


10/31/22 19:00  48 8 103/59 (73) 94 Room Air  


 


10/31/22 19:00  48      


 


10/31/22 18:00  56 17 106/66 (79) 95 Room Air  


 


10/31/22 17:00  46 40 98/52 (67) 94 Room Air  


 


10/31/22 16:00 36.3       


 


10/31/22 16:00  48 13 99/53 (68) 93 Room Air  


 


10/31/22 15:04  57 20 108/56 (73) 97 Room Air  


 


10/31/22 15:03     96 Room Air  


 


10/31/22 15:00  51 27 108/56 (73) 94 Room Air  


 


10/31/22 14:00  58 10 115/66 (82) 92 Room Air  


 


10/31/22 13:00  55 13 115/72 (86) 95 Room Air  


 


10/31/22 12:56  56      


 


10/31/22 12:00     96 Room Air  


 


10/31/22 12:00 36.8       


 


10/31/22 12:00  56 18 104/67 (79) 94 Room Air  


 


10/31/22 11:00  57 25 97/59 (72) 95 Room Air  


 


10/31/22 10:00  50 21 95/65 (75) 89 Room Air  


 


10/31/22 09:00  55 14 95/59 (71) 93 Room Air  


 


10/31/22 08:00     96 Room Air  


 


10/31/22 08:00  55 15 118/73 (88) 96 Room Air  


 


10/31/22 08:00 37.0       


 


10/31/22 07:09  49      














I & O 


 


 11/1/22





 07:00


 


Intake Total 3000 ml


 


Output Total 1900 ml


 


Balance 1100 ml





Capillary Refill : Less Than 3 Seconds


General Appearance:  No Apparent Distress, WD/WN


HEENT:  PERRL/EOMI, Moist Mucous Membranes; No Scleral Icterus (L), No Scleral 

Icterus (R)


Neck:  Normal Inspection


Respiratory:  Lungs Clear, No Accessory Muscle Use, No Respiratory Distress


Cardiovascular:  Regular Rate, Rhythm, No Murmur


Gastrointestinal:  soft, guarding, tenderness (Diffusely tender to palpation)


Extremity:  Normal Capillary Refill, No Pedal Edema


Neurologic/Psychiatric:  Alert, Oriented x3, Normal Mood/Affect


Skin:  Normal Color, Warm/Dry





Results


Lab


Laboratory Tests


10/31/22 08:53: Lab Scanned Report Transfusion Reaction Form


10/31/22 12:49: Lab Scanned Report Transfusion Reaction Form


11/1/22 05:06: 


White Blood Count 8.2, Red Blood Count 3.90L, Hemoglobin 12.8L, Hematocrit 38L, 

Mean Corpuscular Volume 99, Mean Corpuscular Hemoglobin 33, Mean Corpuscular 

Hemoglobin Concent 33, Red Cell Distribution Width 18.5H, Platelet Count 142, 

Mean Platelet Volume 10.7, Immature Granulocyte % (Auto) 0, Neutrophils (%) 

(Auto) 47, Lymphocytes (%) (Auto) 40, Monocytes (%) (Auto) 8, Eosinophils (%) 

(Auto) 3, Basophils (%) (Auto) 1, Neutrophils # (Auto) 3.9, Lymphocytes # (Auto)

3.3, Monocytes # (Auto) 0.7, Eosinophils # (Auto) 0.3, Basophils # (Auto) 0.1, 

Immature Granulocyte # (Auto) 0.0, Sodium Level 137, Potassium Level 4.2, 

Chloride Level 109H, Carbon Dioxide Level 18L, Anion Gap 10, Blood Urea Nitrogen

10, Creatinine 1.10, Estimat Glomerular Filtration Rate 71, BUN/Creatinine Ratio

9, Glucose Level 87, Calcium Level 8.6, Corrected Calcium 8.9, Phosphorus Level 

2.5, Magnesium Level 1.9, Total Bilirubin 0.9, Aspartate Amino Transf (AST/SGOT)

19, Alanine Aminotransferase (ALT/SGPT) 11, Alkaline Phosphatase 41, Total 

Protein 6.1L, Albumin 3.6





Microbiology


10/30/22 MRSA Screen - Final, Complete


           MRSA not isolated





Assessment/Plan


lower gi bleed


hypotension-improved


s/p colonoscopy c polypectomies on 10/25/2022








patient transfused, follow hgb and transfuse as needed


npo


iv fluids


suspect polypectomy site bleed-conservative measures, if continues, may need to 

repeat colonoscopy





CHARITO RODRIGUEZ DO 11/1/22 1550:


Subjective


Subjective/Events-last exam


Patient has a little blood passed today.  Same abominal pain acrossed abdomen he

always has.  No new complaints. Denies n/v fever sweats chills shortness of 

breath or chest pain.





Objective


Exam


General Appearance:  No Apparent Distress, WD/WN


HEENT:  Normal ENT Inspection


Neck:  Full Range of Motion, Normal Inspection, Non Tender


Respiratory:  Chest Non Tender, No Accessory Muscle Use, No Respiratory Distress


Cardiovascular:  Regular Rate, Rhythm, No JVD


Gastrointestinal:  non tender, soft; No guarding; tenderness (minimal across 

abdomen in band distribution, unchanged)


Extremity:  Normal Capillary Refill, Non Tender


Neurologic/Psychiatric:  Alert, Oriented x3


Skin:  Normal Color, Warm/Dry


Lymphatic:  No Adenopathy





Assessment/Plan


lower gi bleed


hypotension-improved


s/p colonoscopy c polypectomies on 10/25/2022








patient transfused, follow hgb and transfuse as needed, hgb stable


advance to clears


iv fluids


suspect polypectomy site bleed-conservative measures, if continues, may need to 

repeat colonoscopy





Supervisory-Addendum Brief


Verification & Attestation


Participated in pt care:  history, MDM, physical


Personally performed:  exam, history, MDM, supervision of care


Care discussed with:  Medical Student


Procedures:  n/a


Results interpretation:  Verified all documentation


Verification and Attestation of Medical Student E/M Service





A medical student performed and documented this service in my presence. I 

reviewed and verified all information documented by the medical student and made

modifications to such information, when appropriate. I personally performed the 

physical exam and medical decision making. 





 Charito Rodriguez, Nov 1, 2022,15:50











BRENDON GASTELUM                Nov 1, 2022 07:05


CHARITO RODRIGUEZ DO               Nov 1, 2022 15:50

## 2022-11-01 NOTE — PROGRESS NOTE - HOSPITALIST
Subjective


HPI/CC On Admission


Date Seen by Provider:  Nov 1, 2022


Pt is a 72yoCM with a PMH of CAD s/p CABG who presented to the ER due to rectal 

bleeding. He was out golfing and went to the bathroom and had a large bloody 

stool. He describes it as losing a "bucket of blood" in the toilet. He then went

and sat in the clubhouse for a while. He then went to bring his golfcart back 

and after driving away he got off the golf cart and nearly passed out and had 

another large bloody BM. He was unable to get off the ground for roughly an hour

following this and eventually crawled up to his golf cart and called for help 

from the golf cart staff. When they arrived he had another large bloody stool 

and then called EMS. EMS reports he was found in a large amount of blood and had

very low BP (note reports SBP of 60s, patient reports in the 40s). He was fluid 

resuscitated in the ER and given 2 units of pRBCs. He reported to the ER about 

having some abd pain and taking a nitro pill but he did not disclose this to me.

He reports feeling much better this morning. Of note he did have 2 colonoscopies

in the last 2 months where he had 20+ polyps removed, the most recent 

colonoscopy was on 10/25.


Subjective/Events-last exam


Pt reports doing well. Only complaint is about not eating and discomfort from 

bed.





Objective


Exam


Vital Signs





Vital Signs








  Date Time  Temp Pulse Resp B/P (MAP) Pulse Ox O2 Delivery O2 Flow Rate FiO2


 


11/1/22 10:00  62 31 115/70 (85) 96 Room Air  


 


11/1/22 07:58 36.2       


 


10/30/22 21:40        21





Capillary Refill : Less Than 3 Seconds


General Appearance:  No Apparent Distress, WD/WN


Respiratory:  Lungs Clear, No Respiratory Distress


Cardiovascular:  Regular Rate, Rhythm, No Murmur


Neurologic/Psychiatric:  Alert, Oriented x3





Results/Procedures


Lab


Laboratory Tests


11/1/22 05:06








Patient resulted labs reviewed.


Imaging:  Reviewed Imaging Report





Assessment/Plan


Assessment and Plan


Assess & Plan/Chief Complaint


GI Bleed


Hemorrhagic shock


   Hgb up to 12.8 today without further blood transfusion


   BP improved


   Surgery consulted, appreciate recs


   Has been off anticoagulation at home for a few months, continue to hold


   Advance to clear liquid diet





HTN


CAD s/p CABG


HLD


   Hold home meds for now





COPD


   MAT protocol


   Continue home inhalers





DVT ppx: Hold for GI bleed





Diagnosis/Problems


Diagnosis/Problems





(1) CAD (coronary artery disease)


(2) Essential (primary) hypertension


(3) HLD (hyperlipidemia)


(4) COPD (chronic obstructive pulmonary disease)


(5) Hemorrhagic shock


Status:  Acute


(6) Rectal bleeding


Status:  Acute


(7) History of colonic polyps











MICHAEL KHAN MD               Nov 1, 2022 08:52

## 2022-11-02 VITALS — DIASTOLIC BLOOD PRESSURE: 69 MMHG | SYSTOLIC BLOOD PRESSURE: 143 MMHG

## 2022-11-02 VITALS — SYSTOLIC BLOOD PRESSURE: 123 MMHG | DIASTOLIC BLOOD PRESSURE: 66 MMHG

## 2022-11-02 VITALS — DIASTOLIC BLOOD PRESSURE: 82 MMHG | SYSTOLIC BLOOD PRESSURE: 130 MMHG

## 2022-11-02 LAB
ALBUMIN SERPL-MCNC: 3.6 GM/DL (ref 3.2–4.5)
ALP SERPL-CCNC: 40 U/L (ref 40–136)
ALT SERPL-CCNC: 13 U/L (ref 0–55)
BASOPHILS # BLD AUTO: 0.1 10^3/UL (ref 0–0.1)
BASOPHILS NFR BLD AUTO: 1 % (ref 0–10)
BILIRUB SERPL-MCNC: 0.8 MG/DL (ref 0.1–1)
BUN/CREAT SERPL: 9
CALCIUM SERPL-MCNC: 8.8 MG/DL (ref 8.5–10.1)
CHLORIDE SERPL-SCNC: 107 MMOL/L (ref 98–107)
CO2 SERPL-SCNC: 19 MMOL/L (ref 21–32)
CREAT SERPL-MCNC: 1.12 MG/DL (ref 0.6–1.3)
EOSINOPHIL # BLD AUTO: 0.2 10^3/UL (ref 0–0.3)
EOSINOPHIL NFR BLD AUTO: 4 % (ref 0–10)
GFR SERPLBLD BASED ON 1.73 SQ M-ARVRAT: 69 ML/MIN
GLUCOSE SERPL-MCNC: 89 MG/DL (ref 70–105)
HCT VFR BLD CALC: 36 % (ref 40–54)
HGB BLD-MCNC: 12.1 G/DL (ref 13.3–17.7)
LYMPHOCYTES # BLD AUTO: 2.4 10^3/UL (ref 1–4)
LYMPHOCYTES NFR BLD AUTO: 42 % (ref 12–44)
MAGNESIUM SERPL-MCNC: 1.9 MG/DL (ref 1.6–2.4)
MANUAL DIFFERENTIAL PERFORMED BLD QL: NO
MCH RBC QN AUTO: 33 PG (ref 25–34)
MCHC RBC AUTO-ENTMCNC: 34 G/DL (ref 32–36)
MCV RBC AUTO: 98 FL (ref 80–99)
MONOCYTES # BLD AUTO: 0.5 10^3/UL (ref 0–1)
MONOCYTES NFR BLD AUTO: 9 % (ref 0–12)
NEUTROPHILS # BLD AUTO: 2.6 10^3/UL (ref 1.8–7.8)
NEUTROPHILS NFR BLD AUTO: 44 % (ref 42–75)
PHOSPHATE SERPL-MCNC: 2.8 MG/DL (ref 2.3–4.7)
PLATELET # BLD: 134 10^3/UL (ref 130–400)
PMV BLD AUTO: 10.5 FL (ref 9–12.2)
POTASSIUM SERPL-SCNC: 3.6 MMOL/L (ref 3.6–5)
PROT SERPL-MCNC: 6.1 GM/DL (ref 6.4–8.2)
SODIUM SERPL-SCNC: 136 MMOL/L (ref 135–145)
WBC # BLD AUTO: 5.8 10^3/UL (ref 4.3–11)

## 2022-11-02 RX ADMIN — MAGNESIUM SULFATE IN DEXTROSE SCH MLS/HR: 10 INJECTION, SOLUTION INTRAVENOUS at 05:56

## 2022-11-02 RX ADMIN — POTASSIUM CHLORIDE SCH MLS/HR: 200 INJECTION, SOLUTION INTRAVENOUS at 05:56

## 2022-11-02 RX ADMIN — PANTOPRAZOLE SODIUM SCH MG: 40 INJECTION, POWDER, FOR SOLUTION INTRAVENOUS at 08:17

## 2022-11-02 RX ADMIN — POTASSIUM CHLORIDE SCH MEQ: 1500 TABLET, EXTENDED RELEASE ORAL at 05:56

## 2022-11-02 NOTE — DISCHARGE INST-SIMPLE/STANDARD
Discharge Inst-Standard


Discharge Medications


New, Converted or Re-Newed RX:  Transmitted to Pharmacy





Patient Instructions/Follow Up


Plan of Care/Instructions/FU:  


Please follow up with your PCP and with Dr Rodriguez to follow up this


hospital stay.


Activity as Tolerated:  Yes


Discharge Diet:  Cardiac Diet


Return to The Hospital For:  


Chest pain, shortness of breath, abdominal pain, blood or dark stools, or


if you feel you are getting worse.











MICHAEL KHAN MD               Nov 2, 2022 09:13

## 2022-11-02 NOTE — DISCHARGE SUMMARY
Diagnosis/Chief Complaint


Date of Admission


Oct 30, 2022 at 8:25 pm


Date of Discharge





Discharge Date:  Nov 2, 2022


Admission Diagnosis


GI Bleed


Primary Care


Mickey Alicia MD


Discharge Diagnosis





(1) CAD (coronary artery disease)


(2) Essential (primary) hypertension


(3) HLD (hyperlipidemia)


(4) COPD (chronic obstructive pulmonary disease)


(5) Hemorrhagic shock


Status:  Acute


(6) Rectal bleeding


Status:  Acute


(7) History of colonic polyps





Discharge Summary


Discharge Physical Exam


Allergies:  


Coded Allergies:  


     No Known Drug Allergies (Unverified , 10/5/17)


Vitals & I&Os





Vital Signs








  Date Time  Temp Pulse Resp B/P (MAP) Pulse Ox O2 Delivery O2 Flow Rate FiO2


 


11/2/22 08:09 35.9 57 18 130/82 (98) 94 Room Air  


 


10/30/22 21:40        21








General Appearance:  No Apparent Distress, WD/WN


Respiratory:  Lungs Clear


Cardiovascular:  Regular Rate, Rhythm


Gastrointestinal:  Normal Bowel Sounds


Neurologic/Psychiatric:  Alert, Oriented x3





Hospital Course


Patient was admitted to the hospital secondary to lower GI bleed.  He was quite 

hypotensive on arrival and there was concern for hemorrhagic shock.  He was 

given 2 units of blood immediately in the emergency department and hemoglobin 

stabilized along with blood pressures.  He did just have a colonoscopy with 

polypectomy on October 25 and this was likely the etiology of his bleed.  His 

hemoglobin remained stable and he had no further bloody bowel movements.  His 

diet was advanced and he tolerated this well.  Surgery was consulted and managed

conservatively.  He was discharged home in stable improved condition to follow-

up with Dr. Rodriguez as already scheduled next week and with his primary care 

physician, Dr. Angulo at [facility].


Labs (last 24 hrs)


Laboratory Tests


11/2/22 05:03: 


White Blood Count 5.8, Red Blood Count 3.65L, Hemoglobin 12.1L, Hematocrit 36L, 

Mean Corpuscular Volume 98, Mean Corpuscular Hemoglobin 33, Mean Corpuscular 

Hemoglobin Concent 34, Red Cell Distribution Width 17.4H, Platelet Count 134, 

Mean Platelet Volume 10.5, Immature Granulocyte % (Auto) 0, Neutrophils (%) 

(Auto) 44, Lymphocytes (%) (Auto) 42, Monocytes (%) (Auto) 9, Eosinophils (%) 

(Auto) 4, Basophils (%) (Auto) 1, Neutrophils # (Auto) 2.6, Lymphocytes # (Auto)

2.4, Monocytes # (Auto) 0.5, Eosinophils # (Auto) 0.2, Basophils # (Auto) 0.1, 

Immature Granulocyte # (Auto) 0.0, Percent Immature Platelet Fraction 7.2, 

Sodium Level 136, Potassium Level 3.6, Chloride Level 107, Carbon Dioxide Level 

19L, Anion Gap 10, Blood Urea Nitrogen 10, Creatinine 1.12, Estimat Glomerular 

Filtration Rate 69, BUN/Creatinine Ratio 9, Glucose Level 89, Calcium Level 8.8,

Corrected Calcium 9.1, Phosphorus Level 2.8, Magnesium Level 1.9, Total 

Bilirubin 0.8, Aspartate Amino Transf (AST/SGOT) 22, Alanine Aminotransferase 

(ALT/SGPT) 13, Alkaline Phosphatase 40, Total Protein 6.1L, Albumin 3.6





Microbiology


10/30/22 MRSA Screen - Final, Complete


           MRSA not isolated


Patient resulted labs reviewed.


Pending Labs


Laboratory Tests


11/2/22 05:03: 


White Blood Count 5.8, Red Blood Count 3.65, Hemoglobin 12.1, Hematocrit 36, 

Mean Corpuscular Volume 98, Mean Corpuscular Hemoglobin 33, Mean Corpuscular 

Hemoglobin Concent 34, Red Cell Distribution Width 17.4, Platelet Count 134, 

Mean Platelet Volume 10.5, Immature Granulocyte % (Auto) 0, Neutrophils (%) 

(Auto) 44, Lymphocytes (%) (Auto) 42, Monocytes (%) (Auto) 9, Eosinophils (%) 

(Auto) 4, Basophils (%) (Auto) 1, Neutrophils # (Auto) 2.6, Lymphocytes # (Auto)

2.4, Monocytes # (Auto) 0.5, Eosinophils # (Auto) 0.2, Basophils # (Auto) 0.1, 

Immature Granulocyte # (Auto) 0.0, Percent Immature Platelet Fraction 7.2, 

Sodium Level 136, Potassium Level 3.6, Chloride Level 107, Carbon Dioxide Level 

19, Anion Gap 10, Blood Urea Nitrogen 10, Creatinine 1.12, Estimat Glomerular 

Filtration Rate 69, BUN/Creatinine Ratio 9, Glucose Level 89, Calcium Level 8.8,

Corrected Calcium 9.1, Phosphorus Level 2.8, Magnesium Level 1.9, Total 

Bilirubin 0.8, Aspartate Amino Transf (AST/SGOT) 22, Alanine Aminotransferase 

(ALT/SGPT) 13, Alkaline Phosphatase 40, Total Protein 6.1, Albumin 3.6





Imaging:  Reviewed Imaging Report





Discussion & Recommendations


Discharge Planning:  >30 minutes discharge planning





Discharge


Home Medications:





Active Scripts


Active


No Active Prescriptions or Reported Medications    





Instructions to patient/family


Please see electronic discharge instructions given to patient.











MICHAEL KHAN MD             Nov 2, 2022 10:15 am

## 2022-11-02 NOTE — PROGRESS NOTE - SURGERY
[facility]


Subjective


Date Seen by a Provider:  Nov 2, 2022


Time Seen by a Provider:  06:22


Subjective/Events-last exam


Pt is feeling better. His abd pain is a 1/10 constantly and gets up to a 6/10 

with reaching for or lifting items. He states he had BM last evening that was d

ark. He is doing well on clear liquid diet.





Objective


Exam





Vital Signs








  Date Time  Temp Pulse Resp B/P (MAP) Pulse Ox O2 Delivery O2 Flow Rate FiO2


 


11/2/22 03:22 36.2 50 16 123/66 (85) 97 Room Air  


 


11/2/22 01:00  53      


 


11/1/22 23:22 36.4 53 16 121/58 (79) 95 Room Air  


 


11/1/22 20:00      Room Air  


 


11/1/22 19:51 36.3 50 20 145/73 (97)  Room Air  


 


11/1/22 19:03  60      


 


11/1/22 16:00 36.4 51 19 136/63 (87) 94 Room Air  


 


11/1/22 14:06     100 Room Air  


 


11/1/22 12:50  53      


 


11/1/22 12:00 36.6       


 


11/1/22 10:00  62 31 115/70 (85) 96 Room Air  


 


11/1/22 09:00  63 17 119/73 (88) 94 Room Air  


 


11/1/22 08:00     95 Room Air  


 


11/1/22 08:00  59 12 126/77 (93) 93 Room Air  


 


11/1/22 07:58 36.2       


 


11/1/22 07:27  50      


 


11/1/22 07:00  55 8 115/70 (85) 92 Room Air  














I & O0 


 


 11/2/22





 07:00


 


Intake Total 3950 ml


 


Output Total 675 ml


 


Balance 3275 ml





Capillary Refill : Less Than 3 Seconds


General Appearance:  No Apparent Distress, WD/WN


HEENT:  PERRL/EOMI, Normal ENT Inspection


Neck:  Full Range of Motion, Normal Inspection, Non Tender


Respiratory:  Chest Non Tender, No Accessory Muscle Use, No Respiratory Distress


Cardiovascular:  Regular Rate, Rhythm, No JVD


Gastrointestinal:  soft; No guarding; tenderness (minimal across abdomen in band

distribution, unchanged)


Extremity:  Normal Capillary Refill, Non Tender


Neurologic/Psychiatric:  Alert, Oriented x3


Skin:  Normal Color, Warm/Dry


Lymphatic:  No Adenopathy





Results


Lab


Laboratory Tests


11/2/22 05:03: 


White Blood Count 5.8, Red Blood Count 3.65L, Hemoglobin 12.1L, Hematocrit 36L, 

Mean Corpuscular Volume 98, Mean Corpuscular Hemoglobin 33, Mean Corpuscular 

Hemoglobin Concent 34, Red Cell Distribution Width 17.4H, Platelet Count 134, 

Mean Platelet Volume 10.5, Immature Granulocyte % (Auto) 0, Neutrophils (%) 

(Auto) 44, Lymphocytes (%) (Auto) 42, Monocytes (%) (Auto) 9, Eosinophils (%) 

(Auto) 4, Basophils (%) (Auto) 1, Neutrophils # (Auto) 2.6, Lymphocytes # (Auto)

2.4, Monocytes # (Auto) 0.5, Eosinophils # (Auto) 0.2, Basophils # (Auto) 0.1, 

Immature Granulocyte # (Auto) 0.0, Percent Immature Platelet Fraction 7.2, 

Sodium Level 136, Potassium Level 3.6, Chloride Level 107, Carbon Dioxide Level 

19L, Anion Gap 10, Blood Urea Nitrogen 10, Creatinine 1.12, Estimat Glomerular 

Filtration Rate 69, BUN/Creatinine Ratio 9, Glucose Level 89, Calcium Level 8.8,

Corrected Calcium 9.1, Phosphorus Level 2.8, Magnesium Level 1.9, Total 

Bilirubin 0.8, Aspartate Amino Transf (AST/SGOT) 22, Alanine Aminotransferase 

(ALT/SGPT) 13, Alkaline Phosphatase 40, Total Protein 6.1L, Albumin 3.6





Microbiology


10/30/22 MRSA Screen - Final, Complete


           MRSA not isolated





Assessment/Plan


lower gi bleed


hypotension-improved


s/p colonoscopy c polypectomies on 10/25/2022








patient transfused, follow hgb and transfuse as needed, hgb stable


on clear liquid diet, progress to bland foods as tolerated


suspect polypectomy site bleed-conservative measures, if continues, may need to 

repeat colonoscopy





CHARITO RODRIGUEZ DO 11/2/22 1907:


Subjective


Subjective/Events-last exam


Not having bloody bowel movements.  Hemoglobin stable.  Not having nausea or 

vomiting.  Tolerating  diet.  No new complaints.  Denies nausea vomiting fever 

sweats chills shortness of breath or chest pain.  Wanting to go home.





Objective


Exam


General Appearance:  No Apparent Distress, WD/WN


HEENT:  PERRL/EOMI, Normal ENT Inspection


Neck:  Full Range of Motion, Supple


Respiratory:  Chest Non Tender, No Accessory Muscle Use, No Respiratory Distress


Cardiovascular:  Regular Rate, Rhythm, No JVD


Gastrointestinal:  soft; No guarding; tenderness (minimal across abdomen in band

distribution, minute)


Extremity:  Normal Capillary Refill, Non Tender


Neurologic/Psychiatric:  Alert, Oriented x3


Skin:  Normal Color, Warm/Dry


Lymphatic:  No Adenopathy





Assessment/Plan


lower gi bleed


hypotension-improved


s/p colonoscopy c polypectomies on 10/25/2022





hgb stable


diet as tolerated


suspect polypectomy site bleed-conservative measures, if continues, may need to 

repeat colonoscopy


since hgb stable and no bleeding okay with dc home








Supervisory-Addendum Brief


Verification & Attestation


Participated in pt care:  history, MDM, physical


Personally performed:  exam, history, MDM, supervision of care


Care discussed with:  Medical Student


Procedures:  n/a


Results interpretation:  Verified all documentation


Verification and Attestation of Medical Student E/M Service





A medical student performed and documented this service in my presence. I 

reviewed and verified all information documented by the medical student and made

modifications to such information, when appropriate. I personally performed the 

physical exam and medical decision making. 





 Charito Rodriguez, Nov 2, 2022,19:08











BRENDON GASTELUM                Nov 2, 2022 06:26


CHARITO RODRIGUEZ DO               Nov 2, 2022 19:07

## 2025-05-13 NOTE — HISTORY & PHYSICAL-HOSPITALIST
"Subjective   Patient ID: Butch Traylor \"Dario\" is a 80 y.o. male who presents for mole removal.  HPI  Patient with abnormal lesion on right scapular area and for removal.  Patient denies of no pain no irritation no bleeding    Using a sterile field doing anesthetize area with Xylocaine with epinephrine using a flat blade we shaved the lesion and cauterized the base dressed it instructions were given to the patient on how to take care of it and skin lesion was sent out for biopsy.  Patient is to return if he has any issues if not we will notify him of biopsy  Review of Systems    Objective   Physical Exam    Assessment/Plan            Del Andrade DO 05/13/25 11:51 AM   " History of Present Illness


HPI/Chief Complaint


Pt is a 72yoCM with a PMH of CAD s/p CABG who presented to the ER due to rectal 

bleeding. He was out golfing and went to the bathroom and had a large bloody 

stool. He describes it as losing a "bucket of blood" in the toilet. He then went

and sat in the clubhouse for a while. He then went to bring his golfcart back 

and after driving away he got off the golf cart and nearly passed out and had 

another large bloody BM. He was unable to get off the ground for roughly an hour

following this and eventually crawled up to his golf cart and called for help 

from the golf cart staff. When they arrived he had another large bloody stool 

and then called EMS. EMS reports he was found in a large amount of blood and had

very low BP (note reports SBP of 60s, patient reports in the 40s). He was fluid 

resuscitated in the ER and given 2 units of pRBCs. He reported to the ER about 

having some abd pain and taking a nitro pill but he did not disclose this to me.

He reports feeling much better this morning. Of note he did have 2 colonoscopies

in the last 2 months where he had 20+ polyps removed, the most recent 

colonoscopy was on 10/25.


Source:  patient


Date Seen


10/31/22


Time Seen by a Provider:  08:35


Attending Physician


Mickey Alicia MD


PCP


Admitting Physician:


Candie Teresa MD 








Attending Physician:


Candie Teresa MD


Referring Physician





Date of Admission


Oct 30, 2022 at 20:25





Home Medications & Allergies


Home Medications


Reviewed patient Home Medication Reconciliation performed by pharmacy medication

reconciliations technician and/or nursing.


Patients Allergies have been reviewed.





Allergies





Allergies


Coded Allergies


  No Known Drug Allergies ([id number])








Past Medical-Social-Family Hx


Patient Social History


Tobacco Use?:  Yes


Tobacco type used:  Cigarettes


Smoking Status:  Current Everyday Smoker


Smokeless Tobacco Frequency:  Former User


Use of E-Cig and/or Vaping dev:  No


Substance use?:  No


Alcohol Use?:  Yes


Alcohol type:  Hard Liquor


Alcohol Frequency:  Once in a while


Pt feels they are or have been:  No





Immunizations Up To Date


First/Initial COVID19 Vaccinat:  NO


Second COVID19 Vaccination Parmjit:  NO


Tetanus Booster (TDap):  More Than 5 Years


Hepatitis A:  No


Hepatitis B:  No





Seasonal Allergies


Seasonal Allergies:  Yes





Current Status


Advance Directives:  No


Communicates:  Verbally


Primary Language:  English


Preferred Spoken Language:  English


Is interpretation needed?:  No


Implanted or Applied Medical D:  Stents





Past Medical History


Surgeries:  CABG


COPD


Coronary Artery Disease, Hypertension, Irregular Heartbeat


Sexually Transmitted Disease:  No


HIV/AIDS:  No


Arthritis, Chronic Back Pain


Hypothyroidsim


Tinnitis


Loss of Vision:  Bilateral


Hearing Impairment:  Denies


Blood Disorders:  No


Adverse Reaction/Blood Tranf:  No





Family Medical History


Reviewed Nursing Family Hx


No Pertinent Family Hx





Review of Systems


Constitutional:  No chills, No fever


EENTM:  no symptoms reported


Respiratory:  dyspnea on exertion, short of breath


Cardiovascular:  No chest pain, No edema; Hx of Intervention, syncope (near 

syncope)


Gastrointestinal:  see HPI


Genitourinary:  no symptoms reported


Musculoskeletal:  no symptoms reported


Skin:  no symptoms reported


Psychiatric/Neurological:  No Symptoms Reported





Physical Exam


Physical Exam


Vital Signs





Vital Signs - First Documented








 10/30/22 10/30/22





 19:06 21:40


 


Temp 34.8 


 


Pulse 81 


 


Resp 16 


 


B/P (MAP) 105/99 (101) 


 


Pulse Ox 94 


 


O2 Delivery Room Air 


 


FiO2  21





Capillary Refill : Less Than 3 Seconds


Height, Weight, BMI


Height: 5'11.00"


Weight: 222lbs. 0.0oz. 100.510297lv; 30.83 BMI


Method:Stated


General Appearance:  No Apparent Distress, WD/WN


HEENT:  PERRL/EOMI, Moist Mucous Membranes; No Scleral Icterus (L), No Scleral 

Icterus (R)


Neck:  Normal Inspection, Supple


Respiratory:  Lungs Clear, No Accessory Muscle Use, No Respiratory Distress


Cardiovascular:  Regular Rate, Rhythm, No Murmur


Gastrointestinal:  Normal Bowel Sounds, Non Tender, Soft; No Distended, No 

Guarding, No Rebound


Extremity:  Normal Capillary Refill, No Calf Tenderness, No Pedal Edema


Neurologic/Psychiatric:  Alert, Oriented x3, Normal Mood/Affect


Skin:  Normal Color, Warm/Dry





Results


Results/Procedures


Labs


Laboratory Tests


10/30/22 19:25








10/31/22 01:14








10/31/22 05:02








Patient resulted labs reviewed.


Imaging:  Reviewed Imaging Report


Imaging


                 [facility] [place], [place]





NAME:   BETH CORNEJO JR


MED REC#:   [id number]


ACCOUNT#:   [id number]


PT STATUS:   ADM IN


:   1950


PHYSICIAN:   DREW TUCKER DO


ADMIT DATE:   10/30/22/ICU


                                  ***Signed***


Date of Exam:10/30/22





ABDOMEN, FLAT & UPRIGHT/DECUB








INDICATION: Bloody stools, recent surgery.





COMPARISON: None.





FINDINGS: Supine and upright views the abdomen demonstrate


nonobstructive small bowel gas pattern. Mild amount of air and


stool are seen scattered throughout the colon. No abnormal


air-fluid levels or large collection of free intraperitoneal air


is seen.





No abnormal extraosseous calcifications or radiopaque foreign


bodies are identified. Bony structures are age-appropriate.





IMPRESSION:


1. Nonobstructive small bowel gas pattern.





Dictated by: 





  Dictated on workstation # [id number]








Dict:   10/30/22 2012


Trans:   10/30/22 2224


[id number]





Interpreted by:     CHRISTIANO GOTTLIEB MD


Electronically signed by: CHRISTIANO GOTTLIEB MD 10/30/22 2224





Assessment/Plan


Admission Diagnosis


GI Bleed


Admission Status:  Inpatient Order (span 2 midnights)


Reason for Inpatient Admission:  


see below





Assessment and Plan


GI Bleed


Hemorrhagic shock


   Hgb stable at 11 but received 2 units pRBCs per patient and nurse report


   BP improved but still on softer side


   Surgery consulted, appreciate recs


   Has been off anticoagulation at home for a few months, continue to hold


   NPO


   Check Hgb in AM or if another blood bowel movement   





HTN


CAD s/p CABG


HLD


   Hold home meds for now





COPD


   MAT protocol


   Continue home inhalers





DVT ppx:





Diagnosis/Problems


Diagnosis/Problems





(1) CAD (coronary artery disease)


(2) Essential (primary) hypertension


(3) HLD (hyperlipidemia)


(4) COPD (chronic obstructive pulmonary disease)


(5) Hemorrhagic shock


Status:  Acute


(6) Rectal bleeding


Status:  Acute


(7) History of colonic polyps











MICHAEL KHAN MD              Oct 31, 2022 09:01